# Patient Record
Sex: FEMALE | Race: OTHER | HISPANIC OR LATINO
[De-identification: names, ages, dates, MRNs, and addresses within clinical notes are randomized per-mention and may not be internally consistent; named-entity substitution may affect disease eponyms.]

---

## 2023-03-29 PROBLEM — Z00.00 ENCOUNTER FOR PREVENTIVE HEALTH EXAMINATION: Status: ACTIVE | Noted: 2023-03-29

## 2023-04-07 ENCOUNTER — APPOINTMENT (OUTPATIENT)
Dept: UROLOGY | Facility: CLINIC | Age: 78
End: 2023-04-07
Payer: MEDICARE

## 2023-04-07 ENCOUNTER — LABORATORY RESULT (OUTPATIENT)
Age: 78
End: 2023-04-07

## 2023-04-07 VITALS
BODY MASS INDEX: 29.45 KG/M2 | WEIGHT: 150 LBS | DIASTOLIC BLOOD PRESSURE: 79 MMHG | TEMPERATURE: 98.6 F | HEIGHT: 60 IN | RESPIRATION RATE: 16 BRPM | HEART RATE: 96 BPM | OXYGEN SATURATION: 98 % | SYSTOLIC BLOOD PRESSURE: 156 MMHG

## 2023-04-07 DIAGNOSIS — N39.3 STRESS INCONTINENCE (FEMALE) (MALE): ICD-10-CM

## 2023-04-07 DIAGNOSIS — R31.29 OTHER MICROSCOPIC HEMATURIA: ICD-10-CM

## 2023-04-07 DIAGNOSIS — R32 UNSPECIFIED URINARY INCONTINENCE: ICD-10-CM

## 2023-04-07 PROCEDURE — 99204 OFFICE O/P NEW MOD 45 MIN: CPT

## 2023-04-07 RX ORDER — MIRABEGRON 25 MG/1
25 TABLET, FILM COATED, EXTENDED RELEASE ORAL
Qty: 30 | Refills: 11 | Status: ACTIVE | COMMUNITY
Start: 2023-04-07 | End: 1900-01-01

## 2023-04-08 LAB
APPEARANCE: CLEAR
BILIRUBIN URINE: NEGATIVE
BLOOD URINE: ABNORMAL
COLOR: NORMAL
GLUCOSE QUALITATIVE U: NEGATIVE
KETONES URINE: NEGATIVE
LEUKOCYTE ESTERASE URINE: ABNORMAL
NITRITE URINE: POSITIVE
PH URINE: 7
PROTEIN URINE: NEGATIVE
SPECIFIC GRAVITY URINE: 1.01
UROBILINOGEN URINE: NORMAL

## 2023-04-09 PROBLEM — N39.3 FEMALE STRESS INCONTINENCE: Status: ACTIVE | Noted: 2023-04-09

## 2023-04-09 PROBLEM — R31.29 MICROSCOPIC HEMATURIA: Status: ACTIVE | Noted: 2023-04-09

## 2023-04-09 NOTE — HISTORY OF PRESENT ILLNESS
[FreeTextEntry1] : 78 yo with history of microhematuria and incontinence\par the patient describes stress incontinence\par no prior history of surgical intervention\par she denies gross hematuria\par \par as per patient her doctor has seen blood in her urine - referred for an assessment\par \par no studies or reports available\par \par PVR 27 cc\par \par \par \par  [Urinary Incontinence] : urinary incontinence [Urinary Retention] : no urinary retention [Urinary Urgency] : no urinary urgency [Urinary Frequency] : no urinary frequency [Nocturia] : no nocturia [Straining] : no straining [Weak Stream] : no weak stream [Hematuria - Gross] : no gross hematuria [Hematuria - Microscopic] : microscopic hematuria [Bladder Spasm] : no bladder spasm [Abdominal Pain] : no abdominal pain [Flank Pain] : no flank pain

## 2023-04-09 NOTE — ASSESSMENT
[FreeTextEntry1] : 76 yo with microhematuria and incontinence\par \par - UA, CUlture, Cytology\par - Renal and Bladder US\par - Mirabegron 25 mg po daily for incontinence\par - awaiting culutre to ensure no infection before we proceed with cystoscopy\par - f/u for possible cystoscopy and to assess reponse to treatment

## 2023-04-11 LAB — BACTERIA UR CULT: ABNORMAL

## 2023-04-13 ENCOUNTER — NON-APPOINTMENT (OUTPATIENT)
Age: 78
End: 2023-04-13

## 2023-05-19 ENCOUNTER — APPOINTMENT (OUTPATIENT)
Dept: UROLOGY | Facility: CLINIC | Age: 78
End: 2023-05-19

## 2024-01-08 ENCOUNTER — EMERGENCY (EMERGENCY)
Facility: HOSPITAL | Age: 79
LOS: 1 days | Discharge: HOME | End: 2024-01-08
Attending: STUDENT IN AN ORGANIZED HEALTH CARE EDUCATION/TRAINING PROGRAM
Payer: COMMERCIAL

## 2024-01-08 VITALS
RESPIRATION RATE: 16 BRPM | HEART RATE: 67 BPM | OXYGEN SATURATION: 99 % | HEIGHT: 60 IN | WEIGHT: 169.98 LBS | DIASTOLIC BLOOD PRESSURE: 75 MMHG | TEMPERATURE: 98 F | SYSTOLIC BLOOD PRESSURE: 155 MMHG

## 2024-01-08 VITALS
HEART RATE: 67 BPM | OXYGEN SATURATION: 99 % | DIASTOLIC BLOOD PRESSURE: 82 MMHG | SYSTOLIC BLOOD PRESSURE: 138 MMHG | RESPIRATION RATE: 18 BRPM | TEMPERATURE: 98 F

## 2024-01-08 LAB
ALBUMIN SERPL ELPH-MCNC: 3.8 G/DL — SIGNIFICANT CHANGE UP (ref 3.5–5)
ALBUMIN SERPL ELPH-MCNC: 3.8 G/DL — SIGNIFICANT CHANGE UP (ref 3.5–5)
ALP SERPL-CCNC: 92 U/L — SIGNIFICANT CHANGE UP (ref 40–120)
ALP SERPL-CCNC: 92 U/L — SIGNIFICANT CHANGE UP (ref 40–120)
ALT FLD-CCNC: 40 U/L DA — SIGNIFICANT CHANGE UP (ref 10–60)
ALT FLD-CCNC: 40 U/L DA — SIGNIFICANT CHANGE UP (ref 10–60)
ANION GAP SERPL CALC-SCNC: 3 MMOL/L — LOW (ref 5–17)
ANION GAP SERPL CALC-SCNC: 3 MMOL/L — LOW (ref 5–17)
APPEARANCE UR: CLEAR — SIGNIFICANT CHANGE UP
APPEARANCE UR: CLEAR — SIGNIFICANT CHANGE UP
APTT BLD: 35.4 SEC — SIGNIFICANT CHANGE UP (ref 24.5–35.6)
APTT BLD: 35.4 SEC — SIGNIFICANT CHANGE UP (ref 24.5–35.6)
AST SERPL-CCNC: 27 U/L — SIGNIFICANT CHANGE UP (ref 10–40)
AST SERPL-CCNC: 27 U/L — SIGNIFICANT CHANGE UP (ref 10–40)
BACTERIA # UR AUTO: ABNORMAL /HPF
BACTERIA # UR AUTO: ABNORMAL /HPF
BASOPHILS # BLD AUTO: 0.04 K/UL — SIGNIFICANT CHANGE UP (ref 0–0.2)
BASOPHILS # BLD AUTO: 0.04 K/UL — SIGNIFICANT CHANGE UP (ref 0–0.2)
BASOPHILS NFR BLD AUTO: 0.6 % — SIGNIFICANT CHANGE UP (ref 0–2)
BASOPHILS NFR BLD AUTO: 0.6 % — SIGNIFICANT CHANGE UP (ref 0–2)
BILIRUB SERPL-MCNC: 0.6 MG/DL — SIGNIFICANT CHANGE UP (ref 0.2–1.2)
BILIRUB SERPL-MCNC: 0.6 MG/DL — SIGNIFICANT CHANGE UP (ref 0.2–1.2)
BILIRUB UR-MCNC: NEGATIVE — SIGNIFICANT CHANGE UP
BILIRUB UR-MCNC: NEGATIVE — SIGNIFICANT CHANGE UP
BLD GP AB SCN SERPL QL: SIGNIFICANT CHANGE UP
BLD GP AB SCN SERPL QL: SIGNIFICANT CHANGE UP
BUN SERPL-MCNC: 15 MG/DL — SIGNIFICANT CHANGE UP (ref 7–18)
BUN SERPL-MCNC: 15 MG/DL — SIGNIFICANT CHANGE UP (ref 7–18)
CALCIUM SERPL-MCNC: 9 MG/DL — SIGNIFICANT CHANGE UP (ref 8.4–10.5)
CALCIUM SERPL-MCNC: 9 MG/DL — SIGNIFICANT CHANGE UP (ref 8.4–10.5)
CHLORIDE SERPL-SCNC: 107 MMOL/L — SIGNIFICANT CHANGE UP (ref 96–108)
CHLORIDE SERPL-SCNC: 107 MMOL/L — SIGNIFICANT CHANGE UP (ref 96–108)
CO2 SERPL-SCNC: 29 MMOL/L — SIGNIFICANT CHANGE UP (ref 22–31)
CO2 SERPL-SCNC: 29 MMOL/L — SIGNIFICANT CHANGE UP (ref 22–31)
COLOR SPEC: YELLOW — SIGNIFICANT CHANGE UP
COLOR SPEC: YELLOW — SIGNIFICANT CHANGE UP
CREAT SERPL-MCNC: 1.05 MG/DL — SIGNIFICANT CHANGE UP (ref 0.5–1.3)
CREAT SERPL-MCNC: 1.05 MG/DL — SIGNIFICANT CHANGE UP (ref 0.5–1.3)
DIFF PNL FLD: ABNORMAL
DIFF PNL FLD: ABNORMAL
EGFR: 54 ML/MIN/1.73M2 — LOW
EGFR: 54 ML/MIN/1.73M2 — LOW
EOSINOPHIL # BLD AUTO: 0.2 K/UL — SIGNIFICANT CHANGE UP (ref 0–0.5)
EOSINOPHIL # BLD AUTO: 0.2 K/UL — SIGNIFICANT CHANGE UP (ref 0–0.5)
EOSINOPHIL NFR BLD AUTO: 2.9 % — SIGNIFICANT CHANGE UP (ref 0–6)
EOSINOPHIL NFR BLD AUTO: 2.9 % — SIGNIFICANT CHANGE UP (ref 0–6)
EPI CELLS # UR: PRESENT
EPI CELLS # UR: PRESENT
GLUCOSE SERPL-MCNC: 84 MG/DL — SIGNIFICANT CHANGE UP (ref 70–99)
GLUCOSE SERPL-MCNC: 84 MG/DL — SIGNIFICANT CHANGE UP (ref 70–99)
GLUCOSE UR QL: NEGATIVE MG/DL — SIGNIFICANT CHANGE UP
GLUCOSE UR QL: NEGATIVE MG/DL — SIGNIFICANT CHANGE UP
HCT VFR BLD CALC: 41.2 % — SIGNIFICANT CHANGE UP (ref 34.5–45)
HCT VFR BLD CALC: 41.2 % — SIGNIFICANT CHANGE UP (ref 34.5–45)
HGB BLD-MCNC: 13.6 G/DL — SIGNIFICANT CHANGE UP (ref 11.5–15.5)
HGB BLD-MCNC: 13.6 G/DL — SIGNIFICANT CHANGE UP (ref 11.5–15.5)
IMM GRANULOCYTES NFR BLD AUTO: 0.3 % — SIGNIFICANT CHANGE UP (ref 0–0.9)
IMM GRANULOCYTES NFR BLD AUTO: 0.3 % — SIGNIFICANT CHANGE UP (ref 0–0.9)
INR BLD: 1 RATIO — SIGNIFICANT CHANGE UP (ref 0.85–1.18)
INR BLD: 1 RATIO — SIGNIFICANT CHANGE UP (ref 0.85–1.18)
KETONES UR-MCNC: NEGATIVE MG/DL — SIGNIFICANT CHANGE UP
KETONES UR-MCNC: NEGATIVE MG/DL — SIGNIFICANT CHANGE UP
LEUKOCYTE ESTERASE UR-ACNC: NEGATIVE — SIGNIFICANT CHANGE UP
LEUKOCYTE ESTERASE UR-ACNC: NEGATIVE — SIGNIFICANT CHANGE UP
LYMPHOCYTES # BLD AUTO: 1.67 K/UL — SIGNIFICANT CHANGE UP (ref 1–3.3)
LYMPHOCYTES # BLD AUTO: 1.67 K/UL — SIGNIFICANT CHANGE UP (ref 1–3.3)
LYMPHOCYTES # BLD AUTO: 23.9 % — SIGNIFICANT CHANGE UP (ref 13–44)
LYMPHOCYTES # BLD AUTO: 23.9 % — SIGNIFICANT CHANGE UP (ref 13–44)
MCHC RBC-ENTMCNC: 30.6 PG — SIGNIFICANT CHANGE UP (ref 27–34)
MCHC RBC-ENTMCNC: 30.6 PG — SIGNIFICANT CHANGE UP (ref 27–34)
MCHC RBC-ENTMCNC: 33 GM/DL — SIGNIFICANT CHANGE UP (ref 32–36)
MCHC RBC-ENTMCNC: 33 GM/DL — SIGNIFICANT CHANGE UP (ref 32–36)
MCV RBC AUTO: 92.8 FL — SIGNIFICANT CHANGE UP (ref 80–100)
MCV RBC AUTO: 92.8 FL — SIGNIFICANT CHANGE UP (ref 80–100)
MONOCYTES # BLD AUTO: 0.54 K/UL — SIGNIFICANT CHANGE UP (ref 0–0.9)
MONOCYTES # BLD AUTO: 0.54 K/UL — SIGNIFICANT CHANGE UP (ref 0–0.9)
MONOCYTES NFR BLD AUTO: 7.7 % — SIGNIFICANT CHANGE UP (ref 2–14)
MONOCYTES NFR BLD AUTO: 7.7 % — SIGNIFICANT CHANGE UP (ref 2–14)
NEUTROPHILS # BLD AUTO: 4.53 K/UL — SIGNIFICANT CHANGE UP (ref 1.8–7.4)
NEUTROPHILS # BLD AUTO: 4.53 K/UL — SIGNIFICANT CHANGE UP (ref 1.8–7.4)
NEUTROPHILS NFR BLD AUTO: 64.6 % — SIGNIFICANT CHANGE UP (ref 43–77)
NEUTROPHILS NFR BLD AUTO: 64.6 % — SIGNIFICANT CHANGE UP (ref 43–77)
NITRITE UR-MCNC: NEGATIVE — SIGNIFICANT CHANGE UP
NITRITE UR-MCNC: NEGATIVE — SIGNIFICANT CHANGE UP
NRBC # BLD: 0 /100 WBCS — SIGNIFICANT CHANGE UP (ref 0–0)
NRBC # BLD: 0 /100 WBCS — SIGNIFICANT CHANGE UP (ref 0–0)
PH UR: 6.5 — SIGNIFICANT CHANGE UP (ref 5–8)
PH UR: 6.5 — SIGNIFICANT CHANGE UP (ref 5–8)
PLATELET # BLD AUTO: 183 K/UL — SIGNIFICANT CHANGE UP (ref 150–400)
PLATELET # BLD AUTO: 183 K/UL — SIGNIFICANT CHANGE UP (ref 150–400)
POTASSIUM SERPL-MCNC: 4.2 MMOL/L — SIGNIFICANT CHANGE UP (ref 3.5–5.3)
POTASSIUM SERPL-MCNC: 4.2 MMOL/L — SIGNIFICANT CHANGE UP (ref 3.5–5.3)
POTASSIUM SERPL-SCNC: 4.2 MMOL/L — SIGNIFICANT CHANGE UP (ref 3.5–5.3)
POTASSIUM SERPL-SCNC: 4.2 MMOL/L — SIGNIFICANT CHANGE UP (ref 3.5–5.3)
PROT SERPL-MCNC: 7.9 G/DL — SIGNIFICANT CHANGE UP (ref 6–8.3)
PROT SERPL-MCNC: 7.9 G/DL — SIGNIFICANT CHANGE UP (ref 6–8.3)
PROT UR-MCNC: NEGATIVE MG/DL — SIGNIFICANT CHANGE UP
PROT UR-MCNC: NEGATIVE MG/DL — SIGNIFICANT CHANGE UP
PROTHROM AB SERPL-ACNC: 11.4 SEC — SIGNIFICANT CHANGE UP (ref 9.5–13)
PROTHROM AB SERPL-ACNC: 11.4 SEC — SIGNIFICANT CHANGE UP (ref 9.5–13)
RBC # BLD: 4.44 M/UL — SIGNIFICANT CHANGE UP (ref 3.8–5.2)
RBC # BLD: 4.44 M/UL — SIGNIFICANT CHANGE UP (ref 3.8–5.2)
RBC # FLD: 14.6 % — HIGH (ref 10.3–14.5)
RBC # FLD: 14.6 % — HIGH (ref 10.3–14.5)
RBC CASTS # UR COMP ASSIST: 4 /HPF — SIGNIFICANT CHANGE UP (ref 0–4)
RBC CASTS # UR COMP ASSIST: 4 /HPF — SIGNIFICANT CHANGE UP (ref 0–4)
SODIUM SERPL-SCNC: 139 MMOL/L — SIGNIFICANT CHANGE UP (ref 135–145)
SODIUM SERPL-SCNC: 139 MMOL/L — SIGNIFICANT CHANGE UP (ref 135–145)
SP GR SPEC: 1.01 — SIGNIFICANT CHANGE UP (ref 1–1.03)
SP GR SPEC: 1.01 — SIGNIFICANT CHANGE UP (ref 1–1.03)
UROBILINOGEN FLD QL: 1 MG/DL — SIGNIFICANT CHANGE UP (ref 0.2–1)
UROBILINOGEN FLD QL: 1 MG/DL — SIGNIFICANT CHANGE UP (ref 0.2–1)
WBC # BLD: 7 K/UL — SIGNIFICANT CHANGE UP (ref 3.8–10.5)
WBC # BLD: 7 K/UL — SIGNIFICANT CHANGE UP (ref 3.8–10.5)
WBC # FLD AUTO: 7 K/UL — SIGNIFICANT CHANGE UP (ref 3.8–10.5)
WBC # FLD AUTO: 7 K/UL — SIGNIFICANT CHANGE UP (ref 3.8–10.5)
WBC UR QL: 0 /HPF — SIGNIFICANT CHANGE UP (ref 0–5)
WBC UR QL: 0 /HPF — SIGNIFICANT CHANGE UP (ref 0–5)

## 2024-01-08 PROCEDURE — 80053 COMPREHEN METABOLIC PANEL: CPT

## 2024-01-08 PROCEDURE — 99285 EMERGENCY DEPT VISIT HI MDM: CPT | Mod: 25

## 2024-01-08 PROCEDURE — 86850 RBC ANTIBODY SCREEN: CPT

## 2024-01-08 PROCEDURE — 86901 BLOOD TYPING SEROLOGIC RH(D): CPT

## 2024-01-08 PROCEDURE — 85730 THROMBOPLASTIN TIME PARTIAL: CPT

## 2024-01-08 PROCEDURE — 36415 COLL VENOUS BLD VENIPUNCTURE: CPT

## 2024-01-08 PROCEDURE — 87186 SC STD MICRODIL/AGAR DIL: CPT

## 2024-01-08 PROCEDURE — 74177 CT ABD & PELVIS W/CONTRAST: CPT | Mod: MA

## 2024-01-08 PROCEDURE — 93975 VASCULAR STUDY: CPT

## 2024-01-08 PROCEDURE — 76830 TRANSVAGINAL US NON-OB: CPT | Mod: 26

## 2024-01-08 PROCEDURE — 76857 US EXAM PELVIC LIMITED: CPT

## 2024-01-08 PROCEDURE — 93975 VASCULAR STUDY: CPT | Mod: 26

## 2024-01-08 PROCEDURE — 74177 CT ABD & PELVIS W/CONTRAST: CPT | Mod: 26,MA

## 2024-01-08 PROCEDURE — 87086 URINE CULTURE/COLONY COUNT: CPT

## 2024-01-08 PROCEDURE — 76830 TRANSVAGINAL US NON-OB: CPT

## 2024-01-08 PROCEDURE — 85610 PROTHROMBIN TIME: CPT

## 2024-01-08 PROCEDURE — 81001 URINALYSIS AUTO W/SCOPE: CPT

## 2024-01-08 PROCEDURE — 76856 US EXAM PELVIC COMPLETE: CPT | Mod: 26,59

## 2024-01-08 PROCEDURE — 86900 BLOOD TYPING SEROLOGIC ABO: CPT

## 2024-01-08 PROCEDURE — 99285 EMERGENCY DEPT VISIT HI MDM: CPT

## 2024-01-08 PROCEDURE — 85025 COMPLETE CBC W/AUTO DIFF WBC: CPT

## 2024-01-08 NOTE — ED PROVIDER NOTE - OBJECTIVE STATEMENT
78-year-old female history of hypertension, hyperlipidemia, presenting with chief complaint of vaginal bleeding and lower abdominal pain  since yesterday.  Patient is denying any chest pain, lightheadedness, syncope, or dizziness.  No nausea, vomiting, fevers or chills.    meds: aspirin, statin, losartan, pantoprazole, baclofen   NKDA

## 2024-01-08 NOTE — ED PROVIDER NOTE - PATIENT PORTAL LINK FT
You can access the FollowMyHealth Patient Portal offered by [facility] by registering at the following website: [email or web address]. By joining [employer]’s FollowMyHealth portal, you will also be able to view your health information using other applications (apps) compatible with our system. You can access the FollowMyHealth Patient Portal offered by [facility] by registering at the following website: [email or web address]. By joining [employer]’s FollowMyHealth portal, you will also be able to view your health information using other applications (apps) compatible with our system.

## 2024-01-08 NOTE — ED PROVIDER NOTE - PROGRESS NOTE DETAILS
CT showing Complex cystic mass of the right adnexa with associated septations and   peripheral nodularity. This may represent a pedunculated ovarian or paraovarian complex cystic mass. Negative for torsion.  pt reassessed, abdomen soft.  sister at bedside and all results reviewed.  Will dc with patient to follow up with GYN-Onc

## 2024-01-08 NOTE — ED PROVIDER NOTE - NSPTACCESSSVCSAPPTDETAILS_ED_ALL_ED_FT
Complex cystic mass of the right adnexa with associated septations and peripheral nodularity.   concerning for malignancy

## 2024-01-08 NOTE — ED ADULT NURSE NOTE - NSFALLUNIVINTERV_ED_ALL_ED
Bed/Stretcher in lowest position, wheels locked, appropriate side rails in place/Call bell, personal items and telephone in reach/Instruct patient to call for assistance before getting out of bed/chair/stretcher/Non-slip footwear applied when patient is off stretcher/[place] to call system/Physically safe environment - no spills, clutter or unnecessary equipment/Purposeful proactive rounding/Room/bathroom lighting operational, light cord in reach Bed/Stretcher in lowest position, wheels locked, appropriate side rails in place/Call bell, personal items and telephone in reach/Instruct patient to call for assistance before getting out of bed/chair/stretcher/Non-slip footwear applied when patient is off stretcher/[place] to call system/Physically safe environment - no spills, clutter or unnecessary equipment/Purposeful proactive rounding/Room/bathroom lighting operational, light cord in reach

## 2024-01-08 NOTE — ED ADULT NURSE NOTE - SUICIDE SCREENING QUESTION 1
Routing refill request to provider for review/approval because:  Drug not active on patient's medication list.    Kimberly Flores RN, [facility]           No

## 2024-01-08 NOTE — ED PROVIDER NOTE - CARE PLAN
1 Principal Discharge DX:	Vaginal bleeding   Principal Discharge DX:	Vaginal bleeding  Secondary Diagnosis:	Pelvic mass

## 2024-01-08 NOTE — ED PROVIDER NOTE - CLINICAL SUMMARY MEDICAL DECISION MAKING FREE TEXT BOX
78-year-old female history of hypertension, hyperlipidemia, presenting with chief complaint of vaginal bleeding and lower abdominal pain  since yesterday. Concern for possible malignancy.  Will check CT abd pelvis as well as cbc. Pt well appearing, +scant vaginal bleeding

## 2024-01-10 PROBLEM — I10 ESSENTIAL (PRIMARY) HYPERTENSION: Chronic | Status: ACTIVE | Noted: 2024-01-08

## 2024-01-10 LAB
-  AMOXICILLIN/CLAVULANIC ACID: SIGNIFICANT CHANGE UP
-  AMOXICILLIN/CLAVULANIC ACID: SIGNIFICANT CHANGE UP
-  AMPICILLIN/SULBACTAM: SIGNIFICANT CHANGE UP
-  AMPICILLIN/SULBACTAM: SIGNIFICANT CHANGE UP
-  AMPICILLIN: SIGNIFICANT CHANGE UP
-  AMPICILLIN: SIGNIFICANT CHANGE UP
-  AZTREONAM: SIGNIFICANT CHANGE UP
-  AZTREONAM: SIGNIFICANT CHANGE UP
-  CEFAZOLIN: SIGNIFICANT CHANGE UP
-  CEFAZOLIN: SIGNIFICANT CHANGE UP
-  CEFEPIME: SIGNIFICANT CHANGE UP
-  CEFEPIME: SIGNIFICANT CHANGE UP
-  CEFOXITIN: SIGNIFICANT CHANGE UP
-  CEFOXITIN: SIGNIFICANT CHANGE UP
-  CEFTRIAXONE: SIGNIFICANT CHANGE UP
-  CEFTRIAXONE: SIGNIFICANT CHANGE UP
-  CEFUROXIME: SIGNIFICANT CHANGE UP
-  CEFUROXIME: SIGNIFICANT CHANGE UP
-  CIPROFLOXACIN: SIGNIFICANT CHANGE UP
-  CIPROFLOXACIN: SIGNIFICANT CHANGE UP
-  ERTAPENEM: SIGNIFICANT CHANGE UP
-  ERTAPENEM: SIGNIFICANT CHANGE UP
-  GENTAMICIN: SIGNIFICANT CHANGE UP
-  GENTAMICIN: SIGNIFICANT CHANGE UP
-  IMIPENEM: SIGNIFICANT CHANGE UP
-  IMIPENEM: SIGNIFICANT CHANGE UP
-  LEVOFLOXACIN: SIGNIFICANT CHANGE UP
-  LEVOFLOXACIN: SIGNIFICANT CHANGE UP
-  MEROPENEM: SIGNIFICANT CHANGE UP
-  MEROPENEM: SIGNIFICANT CHANGE UP
-  NITROFURANTOIN: SIGNIFICANT CHANGE UP
-  NITROFURANTOIN: SIGNIFICANT CHANGE UP
-  PIPERACILLIN/TAZOBACTAM: SIGNIFICANT CHANGE UP
-  PIPERACILLIN/TAZOBACTAM: SIGNIFICANT CHANGE UP
-  TOBRAMYCIN: SIGNIFICANT CHANGE UP
-  TOBRAMYCIN: SIGNIFICANT CHANGE UP
-  TRIMETHOPRIM/SULFAMETHOXAZOLE: SIGNIFICANT CHANGE UP
-  TRIMETHOPRIM/SULFAMETHOXAZOLE: SIGNIFICANT CHANGE UP
CULTURE RESULTS: ABNORMAL
CULTURE RESULTS: ABNORMAL
METHOD TYPE: SIGNIFICANT CHANGE UP
METHOD TYPE: SIGNIFICANT CHANGE UP
ORGANISM # SPEC MICROSCOPIC CNT: ABNORMAL
SPECIMEN SOURCE: SIGNIFICANT CHANGE UP
SPECIMEN SOURCE: SIGNIFICANT CHANGE UP

## 2024-01-16 ENCOUNTER — NON-APPOINTMENT (OUTPATIENT)
Age: 79
End: 2024-01-16

## 2024-01-17 PROBLEM — N39.0 UTI (URINARY TRACT INFECTION): Status: ACTIVE | Noted: 2024-01-17 | Resolved: 2024-02-16

## 2024-01-17 RX ORDER — SULFAMETHOXAZOLE AND TRIMETHOPRIM 800; 160 MG/1; MG/1
800-160 TABLET ORAL
Qty: 6 | Refills: 0 | Status: ACTIVE | COMMUNITY
Start: 2024-01-17 | End: 1900-01-01

## 2024-01-19 ENCOUNTER — LABORATORY RESULT (OUTPATIENT)
Age: 79
End: 2024-01-19

## 2024-01-19 ENCOUNTER — APPOINTMENT (OUTPATIENT)
Dept: GYNECOLOGIC ONCOLOGY | Facility: CLINIC | Age: 79
End: 2024-01-19
Payer: MEDICARE

## 2024-01-19 VITALS
HEIGHT: 60 IN | DIASTOLIC BLOOD PRESSURE: 84 MMHG | BODY MASS INDEX: 32.98 KG/M2 | SYSTOLIC BLOOD PRESSURE: 155 MMHG | WEIGHT: 168 LBS | HEART RATE: 69 BPM

## 2024-01-19 DIAGNOSIS — Z80.9 FAMILY HISTORY OF MALIGNANT NEOPLASM, UNSPECIFIED: ICD-10-CM

## 2024-01-19 DIAGNOSIS — Z86.39 PERSONAL HISTORY OF OTHER ENDOCRINE, NUTRITIONAL AND METABOLIC DISEASE: ICD-10-CM

## 2024-01-19 DIAGNOSIS — Z82.49 FAMILY HISTORY OF ISCHEMIC HEART DISEASE AND OTHER DISEASES OF THE CIRCULATORY SYSTEM: ICD-10-CM

## 2024-01-19 DIAGNOSIS — N39.0 URINARY TRACT INFECTION, SITE NOT SPECIFIED: ICD-10-CM

## 2024-01-19 DIAGNOSIS — I10 ESSENTIAL (PRIMARY) HYPERTENSION: ICD-10-CM

## 2024-01-19 DIAGNOSIS — E07.9 DISORDER OF THYROID, UNSPECIFIED: ICD-10-CM

## 2024-01-19 DIAGNOSIS — Z78.9 OTHER SPECIFIED HEALTH STATUS: ICD-10-CM

## 2024-01-19 DIAGNOSIS — R32 UNSPECIFIED URINARY INCONTINENCE: ICD-10-CM

## 2024-01-19 DIAGNOSIS — Z80.0 FAMILY HISTORY OF MALIGNANT NEOPLASM OF DIGESTIVE ORGANS: ICD-10-CM

## 2024-01-19 DIAGNOSIS — Z80.3 FAMILY HISTORY OF MALIGNANT NEOPLASM OF BREAST: ICD-10-CM

## 2024-01-19 DIAGNOSIS — E78.00 PURE HYPERCHOLESTEROLEMIA, UNSPECIFIED: ICD-10-CM

## 2024-01-19 DIAGNOSIS — Z86.79 PERSONAL HISTORY OF OTHER DISEASES OF THE CIRCULATORY SYSTEM: ICD-10-CM

## 2024-01-19 DIAGNOSIS — Z80.42 FAMILY HISTORY OF MALIGNANT NEOPLASM OF PROSTATE: ICD-10-CM

## 2024-01-19 PROCEDURE — 99205 OFFICE O/P NEW HI 60 MIN: CPT | Mod: 25

## 2024-01-19 PROCEDURE — 58100 BIOPSY OF UTERUS LINING: CPT

## 2024-01-19 RX ORDER — LEVOTHYROXINE SODIUM 100 UG/1
100 TABLET ORAL
Refills: 0 | Status: ACTIVE | COMMUNITY

## 2024-01-19 RX ORDER — ATORVASTATIN CALCIUM 20 MG/1
20 TABLET, FILM COATED ORAL
Refills: 0 | Status: ACTIVE | COMMUNITY

## 2024-01-19 RX ORDER — LINACLOTIDE 145 UG/1
145 CAPSULE, GELATIN COATED ORAL
Refills: 0 | Status: ACTIVE | COMMUNITY

## 2024-01-19 RX ORDER — ASPIRIN 81 MG
81 TABLET, DELAYED RELEASE (ENTERIC COATED) ORAL
Refills: 0 | Status: ACTIVE | COMMUNITY

## 2024-01-19 RX ORDER — FINERENONE 10 MG/1
10 TABLET, FILM COATED ORAL
Refills: 0 | Status: ACTIVE | COMMUNITY

## 2024-01-19 RX ORDER — PANTOPRAZOLE 40 MG/1
40 TABLET, DELAYED RELEASE ORAL
Refills: 0 | Status: ACTIVE | COMMUNITY

## 2024-01-19 RX ORDER — BACLOFEN 20 MG/1
20 TABLET ORAL
Refills: 0 | Status: ACTIVE | COMMUNITY

## 2024-01-19 RX ORDER — LOSARTAN POTASSIUM 50 MG/1
50 TABLET, FILM COATED ORAL
Refills: 0 | Status: ACTIVE | COMMUNITY

## 2024-01-19 NOTE — OB HISTORY
[Total Preg ___] : : [unfilled] [Full Term ___] : [unfilled] (full-term) [Living ___] : [unfilled] (living) [Vaginal ___] : [unfilled] vaginal delivery(s) [AB Spont ___] : [unfilled] miscarriage(s) [Menarche Age ____] : age at menarche was [unfilled] [Menopause  Age ____] : menopause occurred at age [unfilled]

## 2024-01-20 LAB
ALBUMIN SERPL ELPH-MCNC: 4.6 G/DL
ALP BLD-CCNC: 92 U/L
ALT SERPL-CCNC: 25 U/L
ANION GAP SERPL CALC-SCNC: 14 MMOL/L
APTT BLD: 38.3 SEC
AST SERPL-CCNC: 26 U/L
BASOPHILS # BLD AUTO: 0.05 K/UL
BASOPHILS NFR BLD AUTO: 0.6 %
BILIRUB SERPL-MCNC: 0.5 MG/DL
BUN SERPL-MCNC: 17 MG/DL
CALCIUM SERPL-MCNC: 9.5 MG/DL
CANCER AG125 SERPL-ACNC: 8 U/ML
CANCER AG19-9 SERPL-ACNC: <2 U/ML
CEA SERPL-MCNC: 1 NG/ML
CHLORIDE SERPL-SCNC: 103 MMOL/L
CO2 SERPL-SCNC: 24 MMOL/L
CREAT SERPL-MCNC: 0.99 MG/DL
EGFR: 58 ML/MIN/1.73M2
EOSINOPHIL # BLD AUTO: 0.12 K/UL
EOSINOPHIL NFR BLD AUTO: 1.6 %
ESTIMATED AVERAGE GLUCOSE: 120 MG/DL
GLUCOSE SERPL-MCNC: 76 MG/DL
HBA1C MFR BLD HPLC: 5.8 %
HCT VFR BLD CALC: 41 %
HGB BLD-MCNC: 12.9 G/DL
IMM GRANULOCYTES NFR BLD AUTO: 0.3 %
INR PPP: 0.93 RATIO
LYMPHOCYTES # BLD AUTO: 2.49 K/UL
LYMPHOCYTES NFR BLD AUTO: 32.2 %
MAN DIFF?: NORMAL
MCHC RBC-ENTMCNC: 30.4 PG
MCHC RBC-ENTMCNC: 31.5 GM/DL
MCV RBC AUTO: 96.5 FL
MONOCYTES # BLD AUTO: 0.56 K/UL
MONOCYTES NFR BLD AUTO: 7.2 %
NEUTROPHILS # BLD AUTO: 4.5 K/UL
NEUTROPHILS NFR BLD AUTO: 58.1 %
PLATELET # BLD AUTO: 251 K/UL
POTASSIUM SERPL-SCNC: 4.6 MMOL/L
PROT SERPL-MCNC: 7.6 G/DL
PT BLD: 10.6 SEC
RBC # BLD: 4.25 M/UL
RBC # FLD: 14.9 %
SODIUM SERPL-SCNC: 141 MMOL/L
WBC # FLD AUTO: 7.74 K/UL

## 2024-01-20 NOTE — ASSESSMENT
[FreeTextEntry1] : 79 yo with complex right pelvic mass and PMB as well as 1.1 cm enhancing lesion in the lower pole of the right kidney, suspicious for a renal cell carcinoma here for initial consultation.

## 2024-01-20 NOTE — DISCUSSION/SUMMARY
[Reviewed Clinical Lab Test(s)] : Results of clinical tests were reviewed. [Reviewed Radiology Report(s)] : Radiology reports were reviewed. [Reviewed Radiology Film/Image(s)] : Images from radiology studies were reviewed and examined. [Discuss Alternatives/Risks/Benefits w/Patient] : All alternatives, risks, and benefits were discussed with the patient/family and all questions were answered.  Patient expressed good understanding and appreciates the importance of follow up as recommended. [Visit Time ___ Minutes] : [unfilled] minutes [Face to Face Time___ Minutes] : with [unfilled] minutes in face to face consultation. [FreeTextEntry1] : - Patients history and work up to date reviewed in detail. - Differential diagnosis of this complex pelvic mass discussed in detail including; benign, premalignant and malignant etiologies. The use of diagrams and drawings were used to help facilitate the conversation throughout. We discussed the rationale for a pelvic MRI to help evaluate the pelvic mass better as well as the rationale for MRI abdomen to evaluate the concerning 1.1 cm enhancing lesion in the lower pole of the right kidney, suspicious for a renal cell carcinoma. We discussed the importance of follow up with urology and she requested a female provider urologist. Referral sent to Dr. Alegre (task also sent). - Rationale for EMB discussed in detail given her PMB. Patient tolerated the procedure well and standard post procedure precautions provided. Pap smear also done.  - For close interval follow up in 2 weeks to review biopsy results and MRI results. - I spent the time noted on the day of this patient encounter preparing for, providing and documenting the above service. I have counseled and educated the patient on the differential, workup, disease course, and treatment/management plan. Education was provided to the patient during this encounter. All questions and concerns were answered and addressed in detail. -  Pt will complete course of ABX for UTI.

## 2024-01-20 NOTE — PHYSICAL EXAM
[Chaperone Present] : A chaperone was present in the examining room during all aspects of the physical examination [Normal] : Recto-Vaginal Exam: Normal [Fully active, able to carry on all pre-disease performance without restriction] : Status 0 - Fully active, able to carry on all pre-disease performance without restriction [Abnormal] : Bimanual Exam: Abnormal [FreeTextEntry1] : Grisel CHASE [de-identified] : soft, nontender [de-identified] : right adnexal fullness, no TTP

## 2024-01-20 NOTE — PROCEDURE
[Endometrial Biopsy] : an endometrial biopsy [Postmenopausal Bleeding] : postmenopausal bleeding [Patient] : the patient [Written consent] : written consent was obtained prior to the procedure and is detailed in the patient's record [Betadine] : betadine [Silver Nitrate] : silver nitrate [Yes] : the specimen was sent to pathology [No Complications] : none [Tolerated Well] : the patient tolerated the procedure well [FreeTextEntry1] : All the risks, benefits and alternatives to the procedure were extensively discussed with the patient and written consent was obtained. A timeout protocol was performed prior to initiating the procedure. Bimanual examination was done to determine the position of the uterus.  Vaginal speculum was inserted, and the cervix was visualized.  The cervix was cleansed with antiseptic solution.  A single tooth tenaculum was placed along the anterior lip of the cervix. The device was inserted through the cervical canal, into the uterine cavity, and up to the fundus. The depth of the uterus was determined with the instrument The instrument was withdrawn as it was rotated 3-4 times to obtain the sample which was sent in formalin to pathology. Silver nitrate was applied to the tenaculum sites and excellent hemostasis noted.    Followup: The patient tolerated the procedure well without complications.  Standard post-procedure care is explained and return precautions are given.  Specimen sent to pathology for analysis.

## 2024-01-20 NOTE — CHIEF COMPLAINT
[FreeTextEntry1] :   New Patient Consultation for pelvic mass; pmb  CT notes renal mass concerning for malignancy complex ovarian mass UCx notes +UTI  - - abx sent to pharmacy on 1/17/24.

## 2024-01-20 NOTE — LETTER BODY
[Dear  ___] : Dear  [unfilled], [I had the pleasure of evaluating your patient, [unfilled] for ___] : I had the pleasure of evaluating your patient, [unfilled] for [unfilled]. [Attached please find my note.] : Attached please find my note. [FreeTextEntry1] : Labs, endometrial biopsy results, imaging

## 2024-01-20 NOTE — HISTORY OF PRESENT ILLNESS
[FreeTextEntry1] : ref:  [facility] ED  PCP:Antionette Morton MD [phone] GI:  Cannot recall name of GI.  Last colonoscopy  in [place] Cards:  Matt Angulo MD  Patient is [place]ian-speaking only. Patient presents with her sister, Zhanna, during today's visit.  Ms. Salazar, 78 years old, states she had postmenopausal bleeding x 4 days and presented to the ED on 2024.  Workup included imaging identifying right ovarian mass, thickened endometrium, as well as a right kidney mass concerning for RCC.  She was referred for further management.  Denies f/c/n/v/d.  Denies changes to bowel or bladder habits; denies unexplained weight loss or gain.  She does report 3 months of pelvic pain and low back pain.  She does have chronic issues of irritable bowel, as well as urinary urgency and frequency as well as urinary incontinence.  2024 [facility] ED  Chief Complaint: The patient is a 78y Female complaining of vaginal bleeding.  HPI Objective Statement: 78-year-old female history of hypertension, hyperlipidemia, presenting with chief complaint of vaginal bleeding and lower abdominal pain since yesterday.  Patient is denying any chest pain, lightheadedness, syncope, or dizziness.  No nausea, vomiting, fevers or chills. meds: aspirin, statin, losartan, pantoprazole, baclofen  NKDA  Imagin2024 CT A/P ([facility]) LOWER CHEST: Small bilateral atelectasis. LIVER: Hepatic steatosis. A few small hypodense lesions in the liver, too small to characterize. 7 mm enhancing lesion in hepatic segment 5 (image 51 series 2); if clinically indicated, abdominal MR without and with IV contrast may be pursued for further evaluation. BILE DUCTS: Normal caliber. GALLBLADDER: Within normal limits. SPLEEN: Within normal limits. PANCREAS: Within normal limits. ADRENALS: Within normal limits. KIDNEYS/URETERS: 1.9 cm left renal cyst. Apparent 1.1 cm enhancing lesion in the lower pole of the right kidney, suspicious for a renal cell carcinoma. No evidence for a ureteral calculus. No hydronephrosis. BLADDER: Within normal limits. REPRODUCTIVE ORGANS: A few myometrial masses, likely representing uterine fibroids. The left adnexa appears unremarkable. 4.2 x 2.9 cm asymmetric soft tissue structure in the right adnexa may represent a right ovarian mass, or abnormal right ovary due to torsion. If clinically indicated,  pelvic ultrasound may be pursued for further evaluation. BOWEL: No bowel obstruction or grossly thickened bowel wall. Appendix within normal limits. PERITONEUM: No free air or ascites. VESSELS: Calcified atherosclerotic disease. RETROPERITONEUM/LYMPH NODES: No lymphadenopathy. ABDOMINAL WALL: Small fat-containing umbilical hernia. BONES: Degenerative joint disease. Age indeterminate compression fracture at the superior endplate of T11 vertebra. IMPRESSION: 4.2 x 2.9 cm asymmetric soft tissue structure in the right adnexa may represent a right ovarian mass, or abnormal right ovary due to torsion.  If clinically indicated, pelvic ultrasound may be pursued for further evaluation. 7 mm enhancing lesion in hepatic segment 5. Apparent 1.1 cm enhancing lesion in the lower pole of the right kidney, suspicious for a renal cell carcinoma. If clinically indicated, abdominal MR without and with IV contrast may be pursued for further evaluation. Age indeterminate compression fracture at the superior endplate of T11 vertebra.  24 TVUS ([facility]) Uterus: 7.6 cm x 4.1 cm x 5.2 cm. multiple fibroids are appreciated.  There is a posterior submucosal fibroid measuring up to 1.4 cm.  Posterior subserosal fibroid is also noted which measures up to 7 cm.  Multiple parenchymal cystic spaces are appreciated and there is diffuse nonspecific heterogeneity of the uterine parenchyma. Endometrium: 7 mm. Tiny cyst is seen within the endometrium. Right ovary: 2.0 cm x 2.3 cm x 2.3 cm. Posterior to the Right Ovary within the Right Adnexa There Is a 4.3 x 3.6 x 3.9 cm septated cyst.  There is some nodularity of the cyst wall. Arterial and venous flow is  demonstrated.This may represent a pedunculated ovarian versus paraovarian  cystic lesion. Left ovary: 1.5 cm x 1.2 cm x 1.1 cm. Within normal limits. Arterial and venous flow is demonstrated. Fluid: None. IMPRESSION: Complex cystic mass of the right adnexa with associated septations and  peripheral nodularity. This may represent a pedunculated ovarian or paraovarian complex cystic mass. Other differential considerations include mesenteric cyst. The origin of this cystic mass is not clearly delineated on this sonogram. This nonetheless has some suspicious features which may indicate a malignant process. Consider further correlation with contrast-enhanced MRI. No sonographic evidence of acute ovarian torsion at the time scanning. Heterogeneous myomatous uterus. Tiny endometrial cyst with the endometrium measuring 7 mm. This is  prominent in size given patient's postmenopausal status. Dedicated GYN assessment is advised and should dictate the need for further imaging or intervention workup.  POB: G6, P4 (2 miscarriages, 4 vaginal deliveries, oldest child is 60 youngest child is in their 40s, patient cannot recall age) PGYN: Menarche age 12, LMP age 38 PMH: Hypertension, urinary incontinence, urge incontinence, hypothyroidism, hyperlipidemia, irritable bowel syndrome Meds: Myrbetriz, pantoprazole, baclofen, Synthroid, Linzess, Kerendia, aspirin 81 mg, atorvastatin, losartan Allergies: NKDA PSH: No past surgical history Social Hx: Non-smoker, no alcohol, no marijuana, no illicit drugs, lives with her sister. FH: Father-throat cancer-age 75; maternal cousin-breast cancer-age 30; maternal first cousin-kidney cancer-age 50; Brother-prostate cancer-age 70.   Health Maintenance: Mammogram: .  Per patient normal. Colonoscopy: .  Per patient normal PAP: Not in the last 5 to 10 years.

## 2024-01-20 NOTE — REVIEW OF SYSTEMS
[Negative] : Musculoskeletal [Abn Vag Bleeding] : abnormal vaginal bleeding [FreeTextEntry4] : pelvic and low back pain

## 2024-01-22 LAB
HPV HIGH+LOW RISK DNA PNL CVX: NOT DETECTED
INHIBIN B: <7 PG/ML

## 2024-01-27 LAB — CYTOLOGY CVX/VAG DOC THIN PREP: ABNORMAL

## 2024-02-01 ENCOUNTER — APPOINTMENT (OUTPATIENT)
Dept: MRI IMAGING | Facility: CLINIC | Age: 79
End: 2024-02-01
Payer: MEDICARE

## 2024-02-01 PROCEDURE — 72197 MRI PELVIS W/O & W/DYE: CPT

## 2024-02-01 PROCEDURE — 74183 MRI ABD W/O CNTR FLWD CNTR: CPT

## 2024-02-01 PROCEDURE — A9585: CPT

## 2024-02-02 ENCOUNTER — APPOINTMENT (OUTPATIENT)
Dept: GYNECOLOGIC ONCOLOGY | Facility: CLINIC | Age: 79
End: 2024-02-02
Payer: MEDICARE

## 2024-02-02 VITALS
HEART RATE: 86 BPM | BODY MASS INDEX: 32.98 KG/M2 | SYSTOLIC BLOOD PRESSURE: 138 MMHG | DIASTOLIC BLOOD PRESSURE: 78 MMHG | HEIGHT: 60 IN | OXYGEN SATURATION: 97 % | WEIGHT: 168 LBS

## 2024-02-02 DIAGNOSIS — N95.0 POSTMENOPAUSAL BLEEDING: ICD-10-CM

## 2024-02-02 PROCEDURE — 99213 OFFICE O/P EST LOW 20 MIN: CPT

## 2024-02-02 NOTE — ASSESSMENT
[FreeTextEntry1] : 79 yo with complex right pelvic mass and PMB as well as 1.1 cm enhancing lesion in the lower pole of the right kidney, suspicious for a renal cell carcinoma here for follow up consultation.

## 2024-02-02 NOTE — HISTORY OF PRESENT ILLNESS
[FreeTextEntry1] : ref:  [facility] ED  PCP:Antionette Morton MD [phone] GI:  Cannot recall name of GI.  Last colonoscopy  in [place] Cards:  Matt Angulo MD  Patient is [place]odian-speaking only. Patient presents with her sister, Zhanna, during today's visit.  Ms. Salazar, 78 years old, states she had postmenopausal bleeding x 4 days and presented to the ED on 2024.  Workup included imaging identifying right ovarian mass, thickened endometrium, as well as a right kidney mass concerning for RCC.  She was referred for further management.  Denies f/c/n/v/d.  Denies changes to bowel or bladder habits; denies unexplained weight loss or gain.  She does report 3 months of pelvic pain and low back pain.  She does have chronic issues of irritable bowel, as well as urinary urgency and frequency as well as urinary incontinence.  The patient's only issue is some mild vaginal spotting. Since her last visit the patient has an appointment with Dr. Alegre from urology and the patient was reminded of this visit which is upcoming on Monday, .  Instructions provided to the patient on where to report for this visit with Dr. Alegre and she expressed verbal understanding.  Additionally the patient had her MRI performed yesterday however the results are still pending and we discussed that we will review this via telemedicine visit once available.  Also we reached out to pathology and her endometrial biopsy results are still pending.  We discussed that at her next telemedicine visit we reviewed the MRI as well as her pending pathology.  Nonetheless we discussed that once her urological issues have been worked up an endometrial biopsy is back to inform us of the potential management needed for this that at least she would likely need surgical management in the form of bilateral salpingo-oophorectomy and D&C but possible hysterectomy depending on what the endometrial biopsy shows.  The patient expressed that she understands the above and that we will have a telemedicine visit in approximately 1 week to review all of her workup thus far and discuss her definitive management plan.  2024 [facility] ED  Chief Complaint: The patient is a 78y Female complaining of vaginal bleeding.  HPI Objective Statement: 78-year-old female history of hypertension, hyperlipidemia, presenting with chief complaint of vaginal bleeding and lower abdominal pain since yesterday.  Patient is denying any chest pain, lightheadedness, syncope, or dizziness.  No nausea, vomiting, fevers or chills. meds: aspirin, statin, losartan, pantoprazole, baclofen  NKDA  Imagin2024 CT A/P ([facility]) LOWER CHEST: Small bilateral atelectasis. LIVER: Hepatic steatosis. A few small hypodense lesions in the liver, too small to characterize. 7 mm enhancing lesion in hepatic segment 5 (image 51 series 2); if clinically indicated, abdominal MR without and with IV contrast may be pursued for further evaluation. BILE DUCTS: Normal caliber. GALLBLADDER: Within normal limits. SPLEEN: Within normal limits. PANCREAS: Within normal limits. ADRENALS: Within normal limits. KIDNEYS/URETERS: 1.9 cm left renal cyst. Apparent 1.1 cm enhancing lesion in the lower pole of the right kidney, suspicious for a renal cell carcinoma. No evidence for a ureteral calculus. No hydronephrosis. BLADDER: Within normal limits. REPRODUCTIVE ORGANS: A few myometrial masses, likely representing uterine fibroids. The left adnexa appears unremarkable. 4.2 x 2.9 cm asymmetric soft tissue structure in the right adnexa may represent a right ovarian mass, or abnormal right ovary due to torsion. If clinically indicated,  pelvic ultrasound may be pursued for further evaluation. BOWEL: No bowel obstruction or grossly thickened bowel wall. Appendix within normal limits. PERITONEUM: No free air or ascites. VESSELS: Calcified atherosclerotic disease. RETROPERITONEUM/LYMPH NODES: No lymphadenopathy. ABDOMINAL WALL: Small fat-containing umbilical hernia. BONES: Degenerative joint disease. Age indeterminate compression fracture at the superior endplate of T11 vertebra. IMPRESSION: 4.2 x 2.9 cm asymmetric soft tissue structure in the right adnexa may represent a right ovarian mass, or abnormal right ovary due to torsion.  If clinically indicated, pelvic ultrasound may be pursued for further evaluation. 7 mm enhancing lesion in hepatic segment 5. Apparent 1.1 cm enhancing lesion in the lower pole of the right kidney, suspicious for a renal cell carcinoma. If clinically indicated, abdominal MR without and with IV contrast may be pursued for further evaluation. Age indeterminate compression fracture at the superior endplate of T11 vertebra.  24 TVUS ([facility]) Uterus: 7.6 cm x 4.1 cm x 5.2 cm. multiple fibroids are appreciated.  There is a posterior submucosal fibroid measuring up to 1.4 cm.  Posterior subserosal fibroid is also noted which measures up to 7 cm.  Multiple parenchymal cystic spaces are appreciated and there is diffuse nonspecific heterogeneity of the uterine parenchyma. Endometrium: 7 mm. Tiny cyst is seen within the endometrium. Right ovary: 2.0 cm x 2.3 cm x 2.3 cm. Posterior to the Right Ovary within the Right Adnexa There Is a 4.3 x 3.6 x 3.9 cm septated cyst.  There is some nodularity of the cyst wall. Arterial and venous flow is  demonstrated.This may represent a pedunculated ovarian versus paraovarian  cystic lesion. Left ovary: 1.5 cm x 1.2 cm x 1.1 cm. Within normal limits. Arterial and venous flow is demonstrated. Fluid: None. IMPRESSION: Complex cystic mass of the right adnexa with associated septations and  peripheral nodularity. This may represent a pedunculated ovarian or paraovarian complex cystic mass. Other differential considerations include mesenteric cyst. The origin of this cystic mass is not clearly delineated on this sonogram. This nonetheless has some suspicious features which may indicate a malignant process. Consider further correlation with contrast-enhanced MRI. No sonographic evidence of acute ovarian torsion at the time scanning. Heterogeneous myomatous uterus. Tiny endometrial cyst with the endometrium measuring 7 mm. This is  prominent in size given patient's postmenopausal status. Dedicated GYN assessment is advised and should dictate the need for further imaging or intervention workup.  Labs: 24:   = 8; CEA = 1.0;  = <2; Inhibin B = < 7.0; HbA1c = 5.8;    POB: G6, P4 (2 miscarriages, 4 vaginal deliveries, oldest child is 60 youngest child is in their 40s, patient cannot recall age) PGYN: Menarche age 12, LMP age 38 PMH: Hypertension, urinary incontinence, urge incontinence, hypothyroidism, hyperlipidemia, irritable bowel syndrome Meds: Myrbetriz, pantoprazole, baclofen, Synthroid, Linzess, Kerendia, aspirin 81 mg, atorvastatin, losartan Allergies: NKDA PSH: No past surgical history Social Hx: Non-smoker, no alcohol, no marijuana, no illicit drugs, lives with her sister. FH: Father-throat cancer-age 75; maternal cousin-breast cancer-age 30; maternal first cousin-kidney cancer-age 50; Brother-prostate cancer-age 70.   Health Maintenance: Mammogram: .  Per patient normal. Colonoscopy: .  Per patient normal PAP: 24:  Negative for intraepithelial lesion or malignancy.  Negative HPV ([facility])

## 2024-02-02 NOTE — DISCUSSION/SUMMARY
[Reviewed Clinical Lab Test(s)] : Results of clinical tests were reviewed. [Reviewed Radiology Report(s)] : Radiology reports were reviewed. [Reviewed Radiology Film/Image(s)] : Images from radiology studies were reviewed and examined. [Discuss Alternatives/Risks/Benefits w/Patient] : All alternatives, risks, and benefits were discussed with the patient/family and all questions were answered.  Patient expressed good understanding and appreciates the importance of follow up as recommended. [Visit Time ___ Minutes] : [unfilled] minutes [Face to Face Time___ Minutes] : with [unfilled] minutes in face to face consultation. [FreeTextEntry1] : - Patients history and work up to date reviewed in detail. - Differential diagnosis of this complex pelvic mass discussed in detail including; benign, premalignant and malignant etiologies. The use of diagrams and drawings were used to help facilitate the conversation throughout. We discussed the rationale for a pelvic MRI to help evaluate the pelvic mass better as well as the rationale for MRI abdomen to evaluate the concerning 1.1 cm enhancing lesion in the lower pole of the right kidney, suspicious for a renal cell carcinoma. We discussed the importance of follow up with urology and she requested a female provider urologist. Referral sent to Dr. Alegre (task also sent). - Since her last visit the patient has an appointment with Dr. Alegre from urology and the patient was reminded of this visit which is upcoming on Monday, February 5.  Instructions provided to the patient on where to report for this visit with Dr. Alegre and she expressed verbal understanding.  Additionally the patient had her MRI performed yesterday however the results are still pending and we discussed that we will review this via telemedicine visit once available.  Also we reached out to pathology and her endometrial biopsy results are still pending.  We discussed that at her next telemedicine visit we reviewed the MRI as well as her pending pathology.  Nonetheless we discussed that once her urological issues have been worked up an endometrial biopsy is back to inform us of the potential management needed for this that at least she would likely need surgical management in the form of bilateral salpingo-oophorectomy and D&C but possible hysterectomy depending on what the endometrial biopsy shows.  The patient expressed that she understands the above and that we will have a telemedicine visit in approximately 1 week to review all of her workup thus far and discuss her definitive management plan. - I spent the time noted on the day of this patient encounter preparing for, providing and documenting the above service. I have counseled and educated the patient on the differential, workup, disease course, and treatment/management plan. Education was provided to the patient during this encounter. All questions and concerns were answered and addressed in detail.

## 2024-02-02 NOTE — PHYSICAL EXAM
[Chaperone Present] : A chaperone was present in the examining room during all aspects of the physical examination [FreeTextEntry1] : Grisel CHASE [Normal] : No focal neurologic defects observed [Fully active, able to carry on all pre-disease performance without restriction] : Status 0 - Fully active, able to carry on all pre-disease performance without restriction

## 2024-02-05 ENCOUNTER — APPOINTMENT (OUTPATIENT)
Age: 79
End: 2024-02-05
Payer: MEDICARE

## 2024-02-05 VITALS
HEIGHT: 60 IN | HEART RATE: 69 BPM | DIASTOLIC BLOOD PRESSURE: 64 MMHG | BODY MASS INDEX: 32.98 KG/M2 | SYSTOLIC BLOOD PRESSURE: 127 MMHG | TEMPERATURE: 98 F | WEIGHT: 168 LBS | OXYGEN SATURATION: 97 %

## 2024-02-05 PROCEDURE — 99214 OFFICE O/P EST MOD 30 MIN: CPT

## 2024-02-05 NOTE — HISTORY OF PRESENT ILLNESS
[FreeTextEntry1] : 77 yo Bel[place]ian speaking  previously seen by Dr. Renteria for incontinence and microhematuria still on myrbetriq for LUTS Recently presented to ER with vaginal bleeding CT was concerning for right ovarian mass as well as a small right renal mass Subsequent MRI confirmed findings No flank pain but does have low back pain

## 2024-02-05 NOTE — ASSESSMENT
[FreeTextEntry1] : 79 yo M with right renal mass  - Reviewed all available imaging through PACS including CT from ER visit and recent MRI and confirmed findings as written above - Discussed options with pt including active surveillance vs perc ablation vs surgical extirpation. Reviewed the pros and cons of each option and given size of mass, would recommend active surveillance at this time. - FU in 3 months for renal US

## 2024-02-06 ENCOUNTER — APPOINTMENT (OUTPATIENT)
Dept: GYNECOLOGIC ONCOLOGY | Facility: CLINIC | Age: 79
End: 2024-02-06
Payer: MEDICARE

## 2024-02-06 DIAGNOSIS — N85.02 ENDOMETRIAL INTRAEPITHELIAL NEOPLASIA [EIN]: ICD-10-CM

## 2024-02-06 DIAGNOSIS — R19.00 INTRA-ABDOMINAL AND PELVIC SWELLING, MASS AND LUMP, UNSPECIFIED SITE: ICD-10-CM

## 2024-02-06 PROBLEM — N95.0 POSTMENOPAUSAL BLEEDING: Status: ACTIVE | Noted: 2024-02-06

## 2024-02-06 PROCEDURE — 99213 OFFICE O/P EST LOW 20 MIN: CPT

## 2024-02-06 RX ORDER — MEDROXYPROGESTERONE ACETATE 10 MG/1
10 TABLET ORAL DAILY
Qty: 30 | Refills: 2 | Status: ACTIVE | COMMUNITY
Start: 2024-02-06 | End: 1900-01-01

## 2024-02-06 NOTE — DISCUSSION/SUMMARY
[Reviewed Clinical Lab Test(s)] : Results of clinical tests were reviewed. [Reviewed Radiology Report(s)] : Radiology reports were reviewed. [Reviewed Radiology Film/Image(s)] : Images from radiology studies were reviewed and examined. [Discuss Alternatives/Risks/Benefits w/Patient] : All alternatives, risks, and benefits were discussed with the patient/family and all questions were answered.  Patient expressed good understanding and appreciates the importance of follow up as recommended. [Visit Time ___ Minutes] : [unfilled] minutes [Face to Face Time___ Minutes] : with [unfilled] minutes in face to face consultation. [FreeTextEntry1] : - Patients history and work up to date reviewed in detail. - Today, she presents via telemedicine to review her workup to date as well as management plan options. We reviewed her EMB demonstrating atrophic endometrium with focal atypia but further sampling recommended. Additionally, we reviewed her MRI A/P with the right complex adnexal mass. We reviewed Dr. Alegre's workup for the renal mass with no surgical/IR intervention at this time with close interval surveillance follow up with imaging. We discussed the surgical management options and she desires to proceed with definitive surgical management after all the risks/benefits/alternatives were discussed. Additionally, she notes ongoing vaginal bleeding (~3 pads changed yesterday). We discussed initiation of provera prior to her procedure and she is in agreement. Bleeding precautions provided and when to seek evaluation sooner. She expressed verbal understanding.  - We discussed risks and benefits of surgery as well as the typical post operative course.  We also discussed potential alternatives to surgery. Ample time was allowed for questions to be asked and solicited.  All were answered and the patient expressed understanding.    We discussed the risks of surgery which include, but are not limited to:  -Bleeding that may require a blood transfusion  -Infection of the surgical incision sites, lungs, urine, kidneys or IV site that may can compromise healing and require IV antibiotics  -Injury to surrounding structures including the bladder, bowel, ureters, urethra, blood vessels, or nerves that may result in a loss of function or sensation.  -Blood clots in the extremities or lungs, which may lead to long term anticoagulation and difficulty breathing -The need for more surgery  After our discussion, all questions were again answered. She is aware of the risks and benefits and would like to proceed.  Patient will be seen by our anesthesia colleagues in pre-admission testing and have preoperative labs drawn.  - Booking form placed for next available date.  - Patient aware she needs perioperative optimization recommendations and medical clearances. - I spent the time noted on the day of this patient encounter preparing for, providing and documenting the above service. I have counseled and educated the patient on the differential, workup, disease course, and treatment/management plan. Education was provided to the patient during this encounter. All questions and concerns were answered and addressed in detail.

## 2024-02-06 NOTE — REASON FOR VISIT
[Home] : at home, [unfilled] , at the time of the visit. [Medical Office: ([facility])___] : at the medical office located in  [Patient] : the patient [Self] : self [This encounter was initiated by telehealth (audio with video) and converted to telephone (audio only) due to technical difficulties.] : This encounter was initiated by telehealth (audio with video) and converted to telephone (audio only) due to technical difficulties. [FreeTextEntry1] :   follow-up - pelvic mass, PMB  NPA visit 1/19/2024

## 2024-02-06 NOTE — HISTORY OF PRESENT ILLNESS
[FreeTextEntry1] : ref:  [facility] ED  PCP:Antionette Morton MD [phone] GI:  Cannot recall name of GI.  Last colonoscopy  in [place] Cards:  Matt Angulo MD  Patient is Danish-speaking only. Patient presents with her sister, Zhanna, during today's visit.  Ms. Salazar, 78 years old, states she had postmenopausal bleeding x 4 days and presented to the ED on 2024.  Workup included imaging identifying right ovarian mass, thickened endometrium, as well as a right kidney mass concerning for RCC.  She was referred for further management.  Denies f/c/n/v/d.  Denies changes to bowel or bladder habits; denies unexplained weight loss or gain.  She does report 3 months of pelvic pain and low back pain.  She does have chronic issues of irritable bowel, as well as urinary urgency and frequency as well as urinary incontinence.  The patient's only issue is some mild vaginal spotting. Since her last visit the patient has an appointment with Dr. Alegre from urology and the patient was reminded of this visit which is upcoming on Monday, .  Instructions provided to the patient on where to report for this visit with Dr. Alegre and she expressed verbal understanding.  Additionally the patient had her MRI performed yesterday however the results are still pending and we discussed that we will review this via telemedicine visit once available.  Also we reached out to pathology and her endometrial biopsy results are still pending.  We discussed that at her next telemedicine visit we reviewed the MRI as well as her pending pathology.  Nonetheless we discussed that once her urological issues have been worked up an endometrial biopsy is back to inform us of the potential management needed for this that at least she would likely need surgical management in the form of bilateral salpingo-oophorectomy and D&C but possible hysterectomy depending on what the endometrial biopsy shows.  The patient expressed that she understands the above and that we will have a telemedicine visit once all workup was completed.  Today, she presents via telemedicine to review her workup to date as well as management plan options. We reviewed her EMB demonstrating atrophic endometrium with focal atypia but further sampling recommended. Additionally, we reviewed her MRI A/P with the right complex adnexal mass. We reviewed Dr. Alegre's workup for the renal mass with no surgical/IR intervention at this time with close interval surveillance follow up with imaging. We discussed the surgical management options and she desires to proceed with definitive surgical management after all the risks/benefits/alternatives were discussed. Additionally, she notes ongoing vaginal bleeding (~3 pads changed yesterday). We discussed initiation of provera prior to her procedure and she is in agreement. Bleeding precautions provided and when to seek evaluation sooner. She expressed verbal understanding.   2024 [facility] ED  Chief Complaint: The patient is a 78y Female complaining of vaginal bleeding.  HPI Objective Statement: 78-year-old female history of hypertension, hyperlipidemia, presenting with chief complaint of vaginal bleeding and lower abdominal pain since yesterday.  Patient is denying any chest pain, lightheadedness, syncope, or dizziness.  No nausea, vomiting, fevers or chills. meds: aspirin, statin, losartan, pantoprazole, baclofen  NKDA  Imagin2024 CT A/P ([facility]) LOWER CHEST: Small bilateral atelectasis. LIVER: Hepatic steatosis. A few small hypodense lesions in the liver, too small to characterize. 7 mm enhancing lesion in hepatic segment 5 (image 51 series 2); if clinically indicated, abdominal MR without and with IV contrast may be pursued for further evaluation. BILE DUCTS: Normal caliber. GALLBLADDER: Within normal limits. SPLEEN: Within normal limits. PANCREAS: Within normal limits. ADRENALS: Within normal limits. KIDNEYS/URETERS: 1.9 cm left renal cyst. Apparent 1.1 cm enhancing lesion in the lower pole of the right kidney, suspicious for a renal cell carcinoma. No evidence for a ureteral calculus. No hydronephrosis. BLADDER: Within normal limits. REPRODUCTIVE ORGANS: A few myometrial masses, likely representing uterine fibroids. The left adnexa appears unremarkable. 4.2 x 2.9 cm asymmetric soft tissue structure in the right adnexa may represent a right ovarian mass, or abnormal right ovary due to torsion. If clinically indicated,  pelvic ultrasound may be pursued for further evaluation. BOWEL: No bowel obstruction or grossly thickened bowel wall. Appendix within normal limits. PERITONEUM: No free air or ascites. VESSELS: Calcified atherosclerotic disease. RETROPERITONEUM/LYMPH NODES: No lymphadenopathy. ABDOMINAL WALL: Small fat-containing umbilical hernia. BONES: Degenerative joint disease. Age indeterminate compression fracture at the superior endplate of T11 vertebra. IMPRESSION: 4.2 x 2.9 cm asymmetric soft tissue structure in the right adnexa may represent a right ovarian mass, or abnormal right ovary due to torsion.  If clinically indicated, pelvic ultrasound may be pursued for further evaluation. 7 mm enhancing lesion in hepatic segment 5. Apparent 1.1 cm enhancing lesion in the lower pole of the right kidney, suspicious for a renal cell carcinoma. If clinically indicated, abdominal MR without and with IV contrast may be pursued for further evaluation. Age indeterminate compression fracture at the superior endplate of T11 vertebra.  24 TVUS ([facility]) Uterus: 7.6 cm x 4.1 cm x 5.2 cm. multiple fibroids are appreciated.  There is a posterior submucosal fibroid measuring up to 1.4 cm.  Posterior subserosal fibroid is also noted which measures up to 7 cm.  Multiple parenchymal cystic spaces are appreciated and there is diffuse nonspecific heterogeneity of the uterine parenchyma. Endometrium: 7 mm. Tiny cyst is seen within the endometrium. Right ovary: 2.0 cm x 2.3 cm x 2.3 cm. Posterior to the Right Ovary within the Right Adnexa There Is a 4.3 x 3.6 x 3.9 cm septated cyst.  There is some nodularity of the cyst wall. Arterial and venous flow is  demonstrated.This may represent a pedunculated ovarian versus paraovarian  cystic lesion. Left ovary: 1.5 cm x 1.2 cm x 1.1 cm. Within normal limits. Arterial and venous flow is demonstrated. Fluid: None. IMPRESSION: Complex cystic mass of the right adnexa with associated septations and  peripheral nodularity. This may represent a pedunculated ovarian or paraovarian complex cystic mass. Other differential considerations include mesenteric cyst. The origin of this cystic mass is not clearly delineated on this sonogram. This nonetheless has some suspicious features which may indicate a malignant process. Consider further correlation with contrast-enhanced MRI. No sonographic evidence of acute ovarian torsion at the time scanning. Heterogeneous myomatous uterus. Tiny endometrial cyst with the endometrium measuring 7 mm. This is  prominent in size given patient's postmenopausal status. Dedicated GYN assessment is advised and should dictate the need for further imaging or intervention workup.  Labs: 24:   = 8; CEA = 1.0;  = <2; Inhibin B = < 7.0; HbA1c = 5.8;    POB: G6, P4 (2 miscarriages, 4 vaginal deliveries, oldest child is 60 youngest child is in their 40s, patient cannot recall age) PGYN: Menarche age 12, LMP age 38 PMH: Hypertension, urinary incontinence, urge incontinence, hypothyroidism, hyperlipidemia, irritable bowel syndrome Meds: Myrbetriz, pantoprazole, baclofen, Synthroid, Linzess, Kerendia, aspirin 81 mg, atorvastatin, losartan Allergies: NKDA PSH: No past surgical history Social Hx: Non-smoker, no alcohol, no marijuana, no illicit drugs, lives with her sister. FH: Father-throat cancer-age 75; maternal cousin-breast cancer-age 30; maternal first cousin-kidney cancer-age 50; Brother-prostate cancer-age 70.   Health Maintenance: Mammogram: .  Per patient normal. Colonoscopy: .  Per patient normal PAP: 24:  Negative for intraepithelial lesion or malignancy.  Negative HPV ([facility])

## 2024-02-26 ENCOUNTER — OUTPATIENT (OUTPATIENT)
Dept: OUTPATIENT SERVICES | Facility: HOSPITAL | Age: 79
LOS: 1 days | End: 2024-02-26
Payer: COMMERCIAL

## 2024-02-26 VITALS
DIASTOLIC BLOOD PRESSURE: 80 MMHG | HEIGHT: 60 IN | RESPIRATION RATE: 15 BRPM | OXYGEN SATURATION: 98 % | HEART RATE: 76 BPM | WEIGHT: 171.96 LBS | SYSTOLIC BLOOD PRESSURE: 167 MMHG | TEMPERATURE: 99 F

## 2024-02-26 DIAGNOSIS — C54.1 MALIGNANT NEOPLASM OF ENDOMETRIUM: ICD-10-CM

## 2024-02-26 DIAGNOSIS — K21.9 GASTRO-ESOPHAGEAL REFLUX DISEASE WITHOUT ESOPHAGITIS: ICD-10-CM

## 2024-02-26 DIAGNOSIS — N95.0 POSTMENOPAUSAL BLEEDING: ICD-10-CM

## 2024-02-26 DIAGNOSIS — Z01.818 ENCOUNTER FOR OTHER PREPROCEDURAL EXAMINATION: ICD-10-CM

## 2024-02-26 DIAGNOSIS — E03.9 HYPOTHYROIDISM, UNSPECIFIED: ICD-10-CM

## 2024-02-26 DIAGNOSIS — R19.00 INTRA-ABDOMINAL AND PELVIC SWELLING, MASS AND LUMP, UNSPECIFIED SITE: ICD-10-CM

## 2024-02-26 DIAGNOSIS — N85.02 ENDOMETRIAL INTRAEPITHELIAL NEOPLASIA [EIN]: ICD-10-CM

## 2024-02-26 DIAGNOSIS — I10 ESSENTIAL (PRIMARY) HYPERTENSION: ICD-10-CM

## 2024-02-26 LAB — BLD GP AB SCN SERPL QL: SIGNIFICANT CHANGE UP

## 2024-02-26 NOTE — H&P PST ADULT - PROBLEM SELECTOR PLAN 1
Pt schedule for Robotic Assisted Total Laparoscopic Hysterectomy with Bilateral Salpingo-oophorectomy and sentinel Lymph Node Mapping Possible Staging on 3/7/24 with Dr Clifton.     Labs drawn by PCP - will f/u result  Pt was seen by PCP for Medical clearance - will f/u report    Pt was  instructed to stop aspirin/ecotrin and all over the counter medication including vitamins and herbal supplements one week prior to surgery   Instructions given on the use of 4% chlorhexidine wash and Pt verbalized understanding of same   Pt Instructed to have nothing by mouth starting midnight day before surgery  Patient is to expect a phone call day before surgery between the hours of 430- 630pm giving arrival time for surgery   Written and verbal preoperative instructions given to patient with understanding verbalized.     Patient today with STOP bang score 3  Low  risk for IVIS Pt schedule for Robotic Assisted Total Laparoscopic Hysterectomy with Bilateral Salpingo-oophorectomy and sentinel Lymph Node Mapping Possible Staging on 3/7/24 with Dr Clifton.     Labs drawn by PCP - result in chart   Pt was seen by PCP for Medical clearance 2/22/24- will f/u report    Pt was  instructed to stop aspirin/ecotrin and all over the counter medication including vitamins and herbal supplements one week prior to surgery   Instructions given on the use of 4% chlorhexidine wash and Pt verbalized understanding of same   Pt Instructed to have nothing by mouth starting midnight day before surgery  Patient is to expect a phone call day before surgery between the hours of 430- 630pm giving arrival time for surgery   Written and verbal preoperative instructions given to patient with understanding verbalized via interpretation done by sister - Zhanna- [phone] as requested.     Patient today with STOP bang score 3  Low  risk for IVIS

## 2024-02-26 NOTE — H&P PST ADULT - HISTORY OF PRESENT ILLNESS
78 y.o female with PMHx for GERD, IBS, HLD, HTN, Hypothyroidism. C/o of abnormal vaginal bleeding. 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  78 y.o female with PMHx for GERD, IBS, HLD, HTN, Hypothyroidism. C/o of vaginal bleeding for one month.  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

## 2024-02-26 NOTE — H&P PST ADULT - REASON FOR ADMISSION
Robotic Assisted Total Laparoscopic Hysterectomy with Bilateral Salpingo-oophorectomy and sentinel Lymph Node Mapping Possible Staging  on 3/7/24 with Dr Clifton.

## 2024-02-26 NOTE — H&P PST ADULT - ASSESSMENT
Robotic Assisted Total Laparoscopic Hysterectomy with Bilateral Salpingo-oophorectomy and sentinel Lymph Node Mapping Possible Staging  on 3/7/24 with Dr Clifton.  78 y.o female with Endometrial Intraepithelial Neoplasm and Pelvic mass and now schedule for Robotic Assisted Total Laparoscopic Hysterectomy with Bilateral Salpingo-oophorectomy and sentinel Lymph Node Mapping Possible Staging on 3/7/24 with Dr Clifton.     DORA spaulding score 3

## 2024-02-26 NOTE — H&P PST ADULT - NSANTHOSAYNRD_GEN_A_CORE
No. IVIS screening performed.  STOP BANG Legend: 0-2 = LOW Risk; 3-4 = INTERMEDIATE Risk; 5-8 = HIGH Risk

## 2024-02-26 NOTE — H&P PST ADULT - NSICDXPASTMEDICALHX_GEN_ALL_CORE_FT
PAST MEDICAL HISTORY:  GERD (gastroesophageal reflux disease)     History of IBS     HLD (hyperlipidemia)     HTN (hypertension)     Hypothyroidism      PAST MEDICAL HISTORY:  GERD (gastroesophageal reflux disease)     History of IBS     HLD (hyperlipidemia)     HTN (hypertension)     Hypothyroidism     Postmenopausal bleeding

## 2024-02-26 NOTE — H&P PST ADULT - TEMPERATURE IN FAHRENHEIT (DEGREES F)
Patient Information    Lab -- Fasting labwork has been ordered for you to have done in 6 months   Fasting Labs Diet Restrictions  Please come prior to your next appointment to recheck fasting labs.  Please come fasting (at least 8 hours) to your next appointment to recheck labs.  Please drink plenty of water before your appointment.  You can take any prescribed or routine medicine with water before your appointment.  You can brush your teeth even if you are fasting.    Follow Up  -- Follow up with your regular Primary Care Provider IN 6 MONTHS   -- You have been referred to BARIATRIC SURGERY [phone]. Please call if you have not heard from that department in 3 days.     Additional Educational Resources:  For additional resources regarding your symptoms, diagnosis, or further health information, please visit the Health Resources section on [email or web address] or the Online Health Resources section in [facility].     98.6

## 2024-02-26 NOTE — H&P PST ADULT - GENITOURINARY COMMENTS
post menopausal bleeding, now scheudule for surgery post menopausal bleeding, now schedule for surgery

## 2024-02-26 NOTE — H&P PST ADULT - PAIN ASSESSMENT COMPLETED
9/25/19 Patient: Rosa Isela Woodard YOB: 1958 Date of Visit: 9/25/2019 Rylie Roberts MD 
[address] [facility] [address] [address] [place] [address] VIA Facsimile: [phone] Dear Rylie Roberts MD, Thank you for referring Ms. Rosa Isela Woodard to [address] [place] [address] for evaluation. My notes for this consultation are attached. If you have questions, please do not hesitate to call me. I look forward to following your patient along with you. Sincerely, Kuldeep Warren MD 
 
 Yes

## 2024-02-26 NOTE — H&P PST ADULT - NSICDXPROCEDURE_GEN_ALL_CORE_FT
PROCEDURES:  Laparoscopic hysterectomy, total, with BSO, uterus 250 g or less 26-Feb-2024 16:17:45  Aric Degroot

## 2024-02-29 PROCEDURE — G0463: CPT

## 2024-03-06 ENCOUNTER — TRANSCRIPTION ENCOUNTER (OUTPATIENT)
Age: 79
End: 2024-03-06

## 2024-03-07 ENCOUNTER — APPOINTMENT (OUTPATIENT)
Dept: GYNECOLOGIC ONCOLOGY | Facility: HOSPITAL | Age: 79
End: 2024-03-07

## 2024-03-07 ENCOUNTER — INPATIENT (INPATIENT)
Facility: HOSPITAL | Age: 79
LOS: 0 days | Discharge: HOME | DRG: 743 | End: 2024-03-08
Attending: OBSTETRICS & GYNECOLOGY | Admitting: OBSTETRICS & GYNECOLOGY
Payer: COMMERCIAL

## 2024-03-07 ENCOUNTER — RESULT REVIEW (OUTPATIENT)
Age: 79
End: 2024-03-07

## 2024-03-07 VITALS
DIASTOLIC BLOOD PRESSURE: 83 MMHG | WEIGHT: 171.96 LBS | HEIGHT: 60 IN | RESPIRATION RATE: 16 BRPM | OXYGEN SATURATION: 99 % | HEART RATE: 70 BPM | TEMPERATURE: 98 F | SYSTOLIC BLOOD PRESSURE: 154 MMHG

## 2024-03-07 DIAGNOSIS — Z90.710 ACQUIRED ABSENCE OF BOTH CERVIX AND UTERUS: ICD-10-CM

## 2024-03-07 DIAGNOSIS — Z01.818 ENCOUNTER FOR OTHER PREPROCEDURAL EXAMINATION: ICD-10-CM

## 2024-03-07 DIAGNOSIS — N95.0 POSTMENOPAUSAL BLEEDING: ICD-10-CM

## 2024-03-07 DIAGNOSIS — N85.02 ENDOMETRIAL INTRAEPITHELIAL NEOPLASIA [EIN]: ICD-10-CM

## 2024-03-07 DIAGNOSIS — R19.00 INTRA-ABDOMINAL AND PELVIC SWELLING, MASS AND LUMP, UNSPECIFIED SITE: ICD-10-CM

## 2024-03-07 LAB
ANION GAP SERPL CALC-SCNC: 4 MMOL/L — LOW (ref 5–17)
BLD GP AB SCN SERPL QL: SIGNIFICANT CHANGE UP
BUN SERPL-MCNC: 16 MG/DL — SIGNIFICANT CHANGE UP (ref 7–18)
CALCIUM SERPL-MCNC: 8.5 MG/DL — SIGNIFICANT CHANGE UP (ref 8.4–10.5)
CHLORIDE SERPL-SCNC: 107 MMOL/L — SIGNIFICANT CHANGE UP (ref 96–108)
CO2 SERPL-SCNC: 27 MMOL/L — SIGNIFICANT CHANGE UP (ref 22–31)
CREAT SERPL-MCNC: 1.15 MG/DL — SIGNIFICANT CHANGE UP (ref 0.5–1.3)
EGFR: 48 ML/MIN/1.73M2 — LOW
GLUCOSE SERPL-MCNC: 120 MG/DL — HIGH (ref 70–99)
HCT VFR BLD CALC: 39.6 % — SIGNIFICANT CHANGE UP (ref 34.5–45)
HGB BLD-MCNC: 13 G/DL — SIGNIFICANT CHANGE UP (ref 11.5–15.5)
MCHC RBC-ENTMCNC: 31.8 PG — SIGNIFICANT CHANGE UP (ref 27–34)
MCHC RBC-ENTMCNC: 32.8 GM/DL — SIGNIFICANT CHANGE UP (ref 32–36)
MCV RBC AUTO: 96.8 FL — SIGNIFICANT CHANGE UP (ref 80–100)
NRBC # BLD: 0 /100 WBCS — SIGNIFICANT CHANGE UP (ref 0–0)
PLATELET # BLD AUTO: 175 K/UL — SIGNIFICANT CHANGE UP (ref 150–400)
POTASSIUM SERPL-MCNC: 4 MMOL/L — SIGNIFICANT CHANGE UP (ref 3.5–5.3)
POTASSIUM SERPL-SCNC: 4 MMOL/L — SIGNIFICANT CHANGE UP (ref 3.5–5.3)
RBC # BLD: 4.09 M/UL — SIGNIFICANT CHANGE UP (ref 3.8–5.2)
RBC # FLD: 14.9 % — HIGH (ref 10.3–14.5)
SODIUM SERPL-SCNC: 138 MMOL/L — SIGNIFICANT CHANGE UP (ref 135–145)
TROPONIN I, HIGH SENSITIVITY RESULT: 5.2 NG/L — SIGNIFICANT CHANGE UP
WBC # BLD: 16.02 K/UL — HIGH (ref 3.8–10.5)
WBC # FLD AUTO: 16.02 K/UL — HIGH (ref 3.8–10.5)

## 2024-03-07 PROCEDURE — 99232 SBSQ HOSP IP/OBS MODERATE 35: CPT | Mod: GC

## 2024-03-07 PROCEDURE — 88305 TISSUE EXAM BY PATHOLOGIST: CPT | Mod: 26

## 2024-03-07 PROCEDURE — 88307 TISSUE EXAM BY PATHOLOGIST: CPT | Mod: 26

## 2024-03-07 PROCEDURE — 88112 CYTOPATH CELL ENHANCE TECH: CPT | Mod: 26

## 2024-03-07 PROCEDURE — 88342 IMHCHEM/IMCYTCHM 1ST ANTB: CPT | Mod: 26

## 2024-03-07 PROCEDURE — 93010 ELECTROCARDIOGRAM REPORT: CPT

## 2024-03-07 PROCEDURE — 58571 TLH W/T/O 250 G OR LESS: CPT

## 2024-03-07 PROCEDURE — 52000 CYSTOURETHROSCOPY: CPT | Mod: 59,XU

## 2024-03-07 DEVICE — VISTASEAL FIBRIN HUMAN 10ML: Type: IMPLANTABLE DEVICE | Status: FUNCTIONAL

## 2024-03-07 RX ORDER — PANTOPRAZOLE SODIUM 20 MG/1
40 TABLET, DELAYED RELEASE ORAL
Refills: 0 | Status: DISCONTINUED | OUTPATIENT
Start: 2024-03-07 | End: 2024-03-08

## 2024-03-07 RX ORDER — OXYCODONE AND ACETAMINOPHEN 5; 325 MG/1; MG/1
1 TABLET ORAL EVERY 4 HOURS
Refills: 0 | Status: DISCONTINUED | OUTPATIENT
Start: 2024-03-07 | End: 2024-03-08

## 2024-03-07 RX ORDER — MORPHINE SULFATE 50 MG/1
2 CAPSULE, EXTENDED RELEASE ORAL EVERY 4 HOURS
Refills: 0 | Status: DISCONTINUED | OUTPATIENT
Start: 2024-03-07 | End: 2024-03-08

## 2024-03-07 RX ORDER — LEVOTHYROXINE SODIUM 125 MCG
100 TABLET ORAL DAILY
Refills: 0 | Status: DISCONTINUED | OUTPATIENT
Start: 2024-03-07 | End: 2024-03-08

## 2024-03-07 RX ORDER — FINERENONE 20 MG/1
2 TABLET, FILM COATED ORAL
Refills: 0 | DISCHARGE

## 2024-03-07 RX ORDER — HYDROMORPHONE HYDROCHLORIDE 2 MG/ML
0.5 INJECTION INTRAMUSCULAR; INTRAVENOUS; SUBCUTANEOUS
Refills: 0 | Status: DISCONTINUED | OUTPATIENT
Start: 2024-03-07 | End: 2024-03-07

## 2024-03-07 RX ORDER — SODIUM CHLORIDE 9 MG/ML
3 INJECTION INTRAMUSCULAR; INTRAVENOUS; SUBCUTANEOUS EVERY 8 HOURS
Refills: 0 | Status: DISCONTINUED | OUTPATIENT
Start: 2024-03-07 | End: 2024-03-07

## 2024-03-07 RX ORDER — SODIUM CHLORIDE 9 MG/ML
1000 INJECTION, SOLUTION INTRAVENOUS
Refills: 0 | Status: DISCONTINUED | OUTPATIENT
Start: 2024-03-07 | End: 2024-03-08

## 2024-03-07 RX ORDER — SODIUM CHLORIDE 9 MG/ML
1000 INJECTION, SOLUTION INTRAVENOUS
Refills: 0 | Status: DISCONTINUED | OUTPATIENT
Start: 2024-03-07 | End: 2024-03-07

## 2024-03-07 RX ORDER — CHLORHEXIDINE GLUCONATE 213 G/1000ML
1 SOLUTION TOPICAL ONCE
Refills: 0 | Status: COMPLETED | OUTPATIENT
Start: 2024-03-07 | End: 2024-03-07

## 2024-03-07 RX ORDER — ONDANSETRON 8 MG/1
4 TABLET, FILM COATED ORAL ONCE
Refills: 0 | Status: DISCONTINUED | OUTPATIENT
Start: 2024-03-07 | End: 2024-03-07

## 2024-03-07 RX ORDER — FENTANYL CITRATE 50 UG/ML
25 INJECTION INTRAVENOUS
Refills: 0 | Status: DISCONTINUED | OUTPATIENT
Start: 2024-03-07 | End: 2024-03-07

## 2024-03-07 RX ORDER — ASPIRIN/CALCIUM CARB/MAGNESIUM 324 MG
1 TABLET ORAL
Refills: 0 | DISCHARGE

## 2024-03-07 RX ORDER — LOSARTAN POTASSIUM 100 MG/1
50 TABLET, FILM COATED ORAL DAILY
Refills: 0 | Status: DISCONTINUED | OUTPATIENT
Start: 2024-03-07 | End: 2024-03-08

## 2024-03-07 RX ORDER — ATORVASTATIN CALCIUM 80 MG/1
20 TABLET, FILM COATED ORAL AT BEDTIME
Refills: 0 | Status: DISCONTINUED | OUTPATIENT
Start: 2024-03-07 | End: 2024-03-08

## 2024-03-07 RX ADMIN — MORPHINE SULFATE 2 MILLIGRAM(S): 50 CAPSULE, EXTENDED RELEASE ORAL at 22:05

## 2024-03-07 RX ADMIN — MORPHINE SULFATE 2 MILLIGRAM(S): 50 CAPSULE, EXTENDED RELEASE ORAL at 21:50

## 2024-03-07 RX ADMIN — HYDROMORPHONE HYDROCHLORIDE 0.5 MILLIGRAM(S): 2 INJECTION INTRAMUSCULAR; INTRAVENOUS; SUBCUTANEOUS at 11:29

## 2024-03-07 RX ADMIN — HYDROMORPHONE HYDROCHLORIDE 0.5 MILLIGRAM(S): 2 INJECTION INTRAMUSCULAR; INTRAVENOUS; SUBCUTANEOUS at 11:44

## 2024-03-07 RX ADMIN — CHLORHEXIDINE GLUCONATE 1 APPLICATION(S): 213 SOLUTION TOPICAL at 06:39

## 2024-03-07 RX ADMIN — ATORVASTATIN CALCIUM 20 MILLIGRAM(S): 80 TABLET, FILM COATED ORAL at 21:43

## 2024-03-07 NOTE — BRIEF OPERATIVE NOTE - NSICDXBRIEFPROCEDURE_GEN_ALL_CORE_FT
PROCEDURES:  Total abdominal hysterectomy 07-Mar-2024 11:02:13  Adrienne Martinez  Hysteroscopy, robot-assisted 07-Mar-2024 11:03:16  Adrienne Martinez  Bilateral salpingoophorectomy 07-Mar-2024 11:03:26  Adrienne Martinez  [place] lymph node mapping 07-Mar-2024 11:03:35  Adrienne Martinez  Lymphadenectomy, simple 07-Mar-2024 11:04:37  Adrienne Martinez  Cystoscopy 07-Mar-2024 11:05:02  Adrienne Martinez

## 2024-03-07 NOTE — BRIEF OPERATIVE NOTE - NSICDXBRIEFPREOP_GEN_ALL_CORE_FT
PRE-OP DIAGNOSIS:  Endometrial hyperplasia 07-Mar-2024 10:59:25  Adrienne Martinez  Postmenopausal vaginal bleeding 07-Mar-2024 11:00:32  Adrienne Martinez

## 2024-03-07 NOTE — BRIEF OPERATIVE NOTE - OPERATION/FINDINGS
Robotic assisted TLH-BSO, sentinel lymph node mapping with ICG; sentinel lymph node sampling; cystoscopy; Bilateral TAP block

## 2024-03-07 NOTE — ASU PATIENT PROFILE, ADULT - REASON FOR ADMISSION, PROFILE
Robotic Assisted Total Laparoscopic Hysterectomy with Bilateral Salpingo-oophorectomy and sentinel Lymph Node Mapping Possible Staging

## 2024-03-07 NOTE — PROGRESS NOTE ADULT - ASSESSMENT
a/p: 78y F POD#0 s/p robotic assisted total laparoscopic hysterectomy, bilateral salpingo-oophorectomy with pelvic sentinel lymph node mapping, cystoscopy, TAP block. T wave inversions found on EKG.  - advance diet to clear diet  - nye d/c in AM  - cbc/bmp in AM  - medicine consult for TWI, recommendations appreciated  - cardio consulted  - pain management pr pt request  - cont current management  - cont home meds  - plan for d/c tomorrow  - d/w Dr. Torres

## 2024-03-07 NOTE — BRIEF OPERATIVE NOTE - NSICDXBRIEFPOSTOP_GEN_ALL_CORE_FT
POST-OP DIAGNOSIS:  Endometrial hyperplasia 07-Mar-2024 11:00:58  Adrienne Martinez  Postmenopausal vaginal bleeding 07-Mar-2024 11:01:16  Adrienne Martinez  Postmenopausal vaginal bleeding 07-Mar-2024 11:01:21  Adrienne Martinez   abscess to R buttocks

## 2024-03-07 NOTE — CONSULT NOTE ADULT - ASSESSMENT
77 y/o F who presents with a chief complaint of 78 y.o female with PMHx for GERD, IBS, HLD, HTN, Hypothyroidism. C/o of vaginal bleeding for one month.  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 Medicine consulted due to new TWI on EKG.    #CAD (r/o)  #Endometrial Hyperplasia  #Ovarian Mass  #HTN  #HLD  #Hypothyroidism  #Leukocytosis    #CAD  - Hx of CAD  - s/p TAHBSO  - EKG - sinus bradycardia, TWI leads aVL, V2-V6 (anterolateral leads)  - Trop - neg  - denies any chest pain, shortness of breath, palpitation  - Admit to Telemetry  - continue home med - ASA, atorvastatin  - recent stress test done outpatient (Dr. Angulo - [phone])  - outpatient EKG - NSR,   - NSSTTC (2/27/2024) non ischemic  Observe      #Endometrial Hyperplasia  #Ovarian Mass  - s/p robotic TAHBSO (3/8/2024)  - rest of plan per Primary Team (GYN)    #HTN  - home med - losartan  - BP monitoring  - continue home med    #HLD  - continue home med - atorvastatin    #Hypothyroidism  - continue home med - synthroid    #BPH  - continue home med - tamsulosin    #Leukocytosis  - WBC - 16  - likely reactive post-op  - non-septic, afebrile  - hold ABx for now  - monitor CBC

## 2024-03-07 NOTE — PROGRESS NOTE ADULT - SUBJECTIVE AND OBJECTIVE BOX
EDDA CANTU NOTE  POD#0  Patient seen at bedside with sister to alysia. Patient is doing well, endorses abdominal pain 4/10, controlled with pain medications. She is not currently ambulating at this time, nye in place draining clear urine. She is not eating/drinking at the moment but states she is hungry. Denies headache, dizziness, fever/chills, chest pain, shortness of breath, n/v/d, worsening abdominal pain, pelvic pain, vaginal bleeding, lower ext pain/swelling.     ICU Vital Signs Last 24 Hrs  T(C): 36.4 (07 Mar 2024 14:40), Max: 36.7 (07 Mar 2024 06:36)  T(F): 97.5 (07 Mar 2024 14:40), Max: 98.1 (07 Mar 2024 06:36)  HR: 66 (07 Mar 2024 14:40) (51 - 70)  BP: 127/59 (07 Mar 2024 14:40) (95/43 - 154/83)  BP(mean): 79 (07 Mar 2024 14:40) (56 - 84)  ABP: --  ABP(mean): --  RR: 13 (07 Mar 2024 14:40) (11 - 16)  SpO2: 97% (07 Mar 2024 14:40) (97% - 100%)    O2 Parameters below as of 07 Mar 2024 14:40  Patient On (Oxygen Delivery Method): room air    gen: a+ox4, nad  chest: +s1/s2, lung cta b/l  abd: bs+, soft distension post-op, appropriately tender post-op, incisions c/d/i, no guarding or rigidity  pelvic: no vaginal bleeding, nye in place  ext: no edema or tenderness                          13.0   16.02 )-----------( 175      ( 07 Mar 2024 13:09 )             39.6   03-07    138  |  107  |  16  ----------------------------<  120<H>  4.0   |  27  |  1.15    Ca    8.5      07 Mar 2024 13:09    Troponin I, High Sensitivity Result: 5.2: Serial measurements of high sensitivity troponin I (hs TnI) showing rapid   upward or downward changes suggest acute myocardial injury. Please note   that a sustained elevation of hsTnI can be caused by renal disease,   chronic heart failure, sepsis, pulmonary embolism and other clinical   conditions. ng/L (03.07.24 @ 13:09)

## 2024-03-07 NOTE — ASU PATIENT PROFILE, ADULT - NSICDXPASTMEDICALHX_GEN_ALL_CORE_FT
PAST MEDICAL HISTORY:  GERD (gastroesophageal reflux disease)     History of IBS     HLD (hyperlipidemia)     HTN (hypertension)     Hypothyroidism     Postmenopausal bleeding

## 2024-03-07 NOTE — CONSULT NOTE ADULT - TIME BILLING
- Review of records, telemetry, vital signs and daily labs.   - General and cardiovascular physical examination.  - Generation of cardiovascular treatment plan.  - Coordination of care.      Patient was seen and examined by me on 03/07/2024,interim events noted,labs and radiology studies reviewed.  Jed Mondragon MD,FACC.  [address] [place], [place] [id number].  [phone]

## 2024-03-07 NOTE — CONSULT NOTE ADULT - SUBJECTIVE AND OBJECTIVE BOX
SHIRLEY MIREILLE  78y  Female      Patient is a 78y old  Female who presents with a chief complaint of       PAST MEDICAL/SURGICAL HISTORY  PAST MEDICAL & SURGICAL HISTORY:  HTN (hypertension)      GERD (gastroesophageal reflux disease)      Hypothyroidism      HLD (hyperlipidemia)      History of IBS      Postmenopausal bleeding          REVIEW OF SYSTEMS:  CONSTITUTIONAL: No fever, weight loss, or fatigue  EYES: No eye pain, visual disturbances, or discharge  ENMT:  No difficulty hearing, tinnitus, vertigo; No sinus or throat pain  NECK: No pain or stiffness  BREASTS: No pain, masses, or nipple discharge  RESPIRATORY: No cough, wheezing, chills or hemoptysis; No shortness of breath  CARDIOVASCULAR: No chest pain, palpitations, dizziness, or leg swelling  GASTROINTESTINAL: No abdominal or epigastric pain. No nausea, vomiting, or hematemesis; No diarrhea or constipation. No melena or hematochezia.  GENITOURINARY: No dysuria, frequency, hematuria, or incontinence  NEUROLOGICAL: No headaches, memory loss, loss of strength, numbness, or tremors  SKIN: No itching, burning, rashes, or lesions   LYMPH NODES: No enlarged glands  ENDOCRINE: No heat or cold intolerance; No hair loss  MUSCULOSKELETAL: No joint pain or swelling; No muscle, back, or extremity pain  PSYCHIATRIC: No depression, anxiety, mood swings, or difficulty sleeping  HEME/LYMPH: No easy bruising, or bleeding gums  ALLERY AND IMMUNOLOGIC: No hives or eczema    T(C): 36.1 (03-07-24 @ 11:54), Max: 36.7 (03-07-24 @ 06:36)  HR: 61 (03-07-24 @ 12:54) (51 - 70)  BP: 109/57 (03-07-24 @ 12:54) (95/43 - 154/83)  RR: 12 (03-07-24 @ 12:54) (11 - 16)  SpO2: 99% (03-07-24 @ 12:54) (99% - 100%)  Wt(kg): --Vital Signs Last 24 Hrs  T(C): 36.1 (07 Mar 2024 11:54), Max: 36.7 (07 Mar 2024 06:36)  T(F): 97 (07 Mar 2024 11:54), Max: 98.1 (07 Mar 2024 06:36)  HR: 61 (07 Mar 2024 12:54) (51 - 70)  BP: 109/57 (07 Mar 2024 12:54) (95/43 - 154/83)  BP(mean): 72 (07 Mar 2024 12:54) (56 - 72)  RR: 12 (07 Mar 2024 12:54) (11 - 16)  SpO2: 99% (07 Mar 2024 12:54) (99% - 100%)    Parameters below as of 07 Mar 2024 12:54  Patient On (Oxygen Delivery Method): room air        PHYSICAL EXAM:  GENERAL: NAD, well-groomed, well-developed  HEAD:  Atraumatic, Normocephalic  EYES: EOMI, PERRLA, conjunctiva and sclera clear  ENMT: No tonsillar erythema, exudates, or enlargement; Moist mucous membranes, Good dentition, No lesions  NECK: Supple, No JVD, Normal thyroid  NERVOUS SYSTEM:  Alert & Oriented X3, Good concentration; Motor Strength 5/5 B/L upper and lower extremities; DTRs 2+ intact and symmetric  CHEST/LUNG: Clear to percussion bilaterally; No rales, rhonchi, wheezing, or rubs  HEART: Regular rate and rhythm; No murmurs, rubs, or gallops  ABDOMEN: Soft, Nontender, Nondistended; Bowel sounds present  EXTREMITIES:  2+ Peripheral Pulses, No clubbing, cyanosis, or edema  LYMPH: No lymphadenopathy noted  SKIN: No rashes or lesions    Consultant(s) Notes Reviewed:  [x ] YES  [ ] NO  Care Discussed with Consultants/Other Providers [ x] YES  [ ] NO    LABS:  CBC   03-07-24 @ 13:09  Hematcorit 39.6  Hemoglobin 13.0  Mean Cell Hemoglobin 31.8  Platelet Count-Automated 175  RBC Count 4.09  Red Cell Distrib Width 14.9  Wbc Count 16.02      BMP  03-07-24 @ 13:09  Anion Gap. Serum 4  Blood Urea Nitrogen,Serm 16  Calcium, Total Serum 8.5  Carbon Dioxide, Serum 27  Chloride, Serum 107  Creatinine, Serum 1.15  eGFR in  --  eGFR in Non Afican American --  Gloucose, serum 120  Potassium, Serum 4.0  Sodium, Serum 138                  CMP  03-07-24 @ 13:09  Aleja Aminotransferase(ALT/SGPT)--  Albumin, Serum --  Alkaline Phosphatase, Serum --  Anion Gap, Serum 4  Aspartate Aminotransferase (AST/SGOT)--  Bilirubin Total, Serum --  Blood Urea Nitrogen, Serum 16  Calcium,Total Serum 8.5  Carbon Dioxide, Serum 27  Chloride, Serum 107  Creatinine, Serum 1.15  eGFR if  --  eGFR if Non African American --  Glucose, Serum 120  Potassium, Serum 4.0  Protein Total, Serum --  Sodium, Serum 138                          PT/INR      Amylase/Lipase            RADIOLOGY & ADDITIONAL TESTS:    Imaging Personally Reviewed:  [ ] YES  [ ] NO Patient is a 79 y/o F who presents with a chief complaint of 78 y.o female with PMHx for GERD, IBS, HLD, HTN, Hypothyroidism. C/o of vaginal bleeding for one month.  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 Medicine consulted due to new TWI on EKG.        PAST MEDICAL/SURGICAL HISTORY  PAST MEDICAL & SURGICAL HISTORY:  HTN (hypertension)      GERD (gastroesophageal reflux disease)      Hypothyroidism      HLD (hyperlipidemia)      History of IBS      Postmenopausal bleeding          REVIEW OF SYSTEMS:  CONSTITUTIONAL: No fever, weight loss, or fatigue  EYES: No eye pain, visual disturbances, or discharge  ENMT:  No difficulty hearing, tinnitus, vertigo; No sinus or throat pain  NECK: No pain or stiffness  BREASTS: No pain, masses, or nipple discharge  RESPIRATORY: No cough, wheezing, chills or hemoptysis; No shortness of breath  CARDIOVASCULAR: No chest pain, palpitations, dizziness, or leg swelling  GASTROINTESTINAL: No abdominal or epigastric pain. No nausea, vomiting, or hematemesis; No diarrhea or constipation. No melena or hematochezia.  GENITOURINARY: No dysuria, frequency, hematuria, or incontinence  NEUROLOGICAL: No headaches, memory loss, loss of strength, numbness, or tremors  SKIN: No itching, burning, rashes, or lesions   LYMPH NODES: No enlarged glands  ENDOCRINE: No heat or cold intolerance; No hair loss  MUSCULOSKELETAL: No joint pain or swelling; No muscle, back, or extremity pain  PSYCHIATRIC: No depression, anxiety, mood swings, or difficulty sleeping  HEME/LYMPH: No easy bruising, or bleeding gums  ALLERY AND IMMUNOLOGIC: No hives or eczema    T(C): 36.1 (03-07-24 @ 11:54), Max: 36.7 (03-07-24 @ 06:36)  HR: 61 (03-07-24 @ 12:54) (51 - 70)  BP: 109/57 (03-07-24 @ 12:54) (95/43 - 154/83)  RR: 12 (03-07-24 @ 12:54) (11 - 16)  SpO2: 99% (03-07-24 @ 12:54) (99% - 100%)  Wt(kg): --Vital Signs Last 24 Hrs  T(C): 36.1 (07 Mar 2024 11:54), Max: 36.7 (07 Mar 2024 06:36)  T(F): 97 (07 Mar 2024 11:54), Max: 98.1 (07 Mar 2024 06:36)  HR: 61 (07 Mar 2024 12:54) (51 - 70)  BP: 109/57 (07 Mar 2024 12:54) (95/43 - 154/83)  BP(mean): 72 (07 Mar 2024 12:54) (56 - 72)  RR: 12 (07 Mar 2024 12:54) (11 - 16)  SpO2: 99% (07 Mar 2024 12:54) (99% - 100%)    Parameters below as of 07 Mar 2024 12:54  Patient On (Oxygen Delivery Method): room air        PHYSICAL EXAM:  GENERAL: NAD, well-groomed, well-developed  HEAD:  Atraumatic, Normocephalic  EYES: EOMI, PERRLA, conjunctiva and sclera clear  ENMT: No tonsillar erythema, exudates, or enlargement; Moist mucous membranes, Good dentition, No lesions  NECK: Supple, No JVD, Normal thyroid  NERVOUS SYSTEM:  Alert & Oriented X3, Good concentration; Motor Strength 5/5 B/L upper and lower extremities; DTRs 2+ intact and symmetric  CHEST/LUNG: Clear to percussion bilaterally; No rales, rhonchi, wheezing, or rubs  HEART: Regular rate and rhythm; No murmurs, rubs, or gallops  ABDOMEN: Soft, Nontender, Nondistended; Bowel sounds present  EXTREMITIES:  2+ Peripheral Pulses, No clubbing, cyanosis, or edema  LYMPH: No lymphadenopathy noted  SKIN: No rashes or lesions    Consultant(s) Notes Reviewed:  [x ] YES  [ ] NO  Care Discussed with Consultants/Other Providers [ x] YES  [ ] NO    LABS:  CBC   03-07-24 @ 13:09  Hematcorit 39.6  Hemoglobin 13.0  Mean Cell Hemoglobin 31.8  Platelet Count-Automated 175  RBC Count 4.09  Red Cell Distrib Width 14.9  Wbc Count 16.02      BMP  03-07-24 @ 13:09  Anion Gap. Serum 4  Blood Urea Nitrogen,Serm 16  Calcium, Total Serum 8.5  Carbon Dioxide, Serum 27  Chloride, Serum 107  Creatinine, Serum 1.15  eGFR in  --  eGFR in Non Afican American --  Gloucose, serum 120  Potassium, Serum 4.0  Sodium, Serum 138                  CMP  03-07-24 @ 13:09  Aleja Aminotransferase(ALT/SGPT)--  Albumin, Serum --  Alkaline Phosphatase, Serum --  Anion Gap, Serum 4  Aspartate Aminotransferase (AST/SGOT)--  Bilirubin Total, Serum --  Blood Urea Nitrogen, Serum 16  Calcium,Total Serum 8.5  Carbon Dioxide, Serum 27  Chloride, Serum 107  Creatinine, Serum 1.15  eGFR if  --  eGFR if Non African American --  Glucose, Serum 120  Potassium, Serum 4.0  Protein Total, Serum --  Sodium, Serum 138                          PT/INR      Amylase/Lipase            RADIOLOGY & ADDITIONAL TESTS:    Imaging Personally Reviewed:  [ ] YES  [ ] NO

## 2024-03-07 NOTE — PACU DISCHARGE NOTE - COMMENTS
Clear for discharge from pacu. No anesthetic complications  to 6n on telemetry  TWI in v4,v5,v6 spoke with Dr. Beavers, Go to 6N on telemetry. TO be followed by medicine team as well  Troponin is negative

## 2024-03-07 NOTE — CHART NOTE - NSCHARTNOTEFT_GEN_A_CORE
Dr. Julio medicine and Dr. Mondragon cardiology consulted for t-wave inversions in PACU Dr. Juilo medicine and Dr. Mondragon cardiology consulted for t-wave inversions in PACU  pt currently resting comfortably, no signs of chest pain, shortness of breath, or any other concerns  current vitals: 60 hr, 123/55 bp, 90 spo2 on room air, 10 rr  EKG shows signs of abnormal T waves possible anterolateral ischemic changes  awaiting labs at this time

## 2024-03-07 NOTE — CONSULT NOTE ADULT - ATTENDING COMMENTS
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 Medicine consulted for new TWI in EKG, found after patient brought to PACU.     Per patient she had recent cardiology visit and thinks it was a stress test that was done, however unable to recall the results. She had seen Dr Matt Angulo ([phone]), and pcp had a copy of her cardiac clearance note which states she was deemed acceptable risk for intmd risk procedure but not copies of prior ekg given. Today patient seen and examined at bedside, states she had no chest pain or dyspnea.      Labs, vitals and EKG reviewed. BP in 150s, HR 70s. Trop noted to be negative. Leukocytosis to 16, however may be reactive d/t surgery. EKG with TWI in EKG in V1-V6, and avL   On exam: elderly female, lying calmly in bed, NAD, NC/AT, RRR, lungs CTA, mild abd ttp around surgical site, no LE edema     #Abnl EKG   #New TWI   #Endometrial Hyperplasia   #Ovarian Mass   #s/p robotic TAHBSO   #HTN   #HLD   #Hypothyroidism   #Leukocytosis   -monitor on telemetry   -c/w home asa, statin   -pt reports recent stress test, tried to obtain from cardiologist, however office closed   -obtain TTE   -resume home medication, losartan, synthroid   -trend cbc, monitor off abx for now as no fevers noted   -nye mgmt per gyn

## 2024-03-07 NOTE — CONSULT NOTE ADULT - ASSESSMENT
Office Note  Chief Complaint   Patient presents with   • Diabetes     follow up, due for eye exam        HPI: Patient is a 87 year old female new to me here for a current evaluation of DM and HTN. Presents with daughter (who is a doctor)    DM  - Compliant with medication regimen  - Fasting blood sugar ranges from 130-180 mg/dL  - UTD on Ophthalmology, last eye exam was in 3/2019   - Denies side effects with medication or eye discharge  - Last HbA1c was 7.9% as of 10/5/2019  - Previously on Toujeo 300 MG daily  - If FBS is <130 mg/dL, pt takes half of insulin dose; if above, increases dose    HTN  - Compliant with medication regimen w/o side effects   - Denies dizziness, lightheadedness, SOB, CP, LE edema, HA, vision changes  - Denies fever, chills, acute issues    Hx CVA  - Compliant with blood thinner  - Denies excessive bruising  - No recent issues     Hx Lt Tibial Fracture  - Occurred 3/2019  - Resulted in ORIF up LLE  - 3 months at rehab center  - Physical therapy done after surgery  - Reports it was a \"clumsy\" mechanical fall  - Last visit with Ortho was~2 weeks ago   - Continues PT ~once a week  - Denies pain upon movement  - Ambulates with 4-pronged cane, doing well and getting strength back     Hx Recurrent UTI  - Believes it may be complication of DM  - Previously became disoriented  - Currently asymptomatic  - No urinary complaints/issues    C/o abnormal skin lesion  - Daughter (is a physician) believes possible squamous cell on Rt temporal region lateral to Rt eye    - Has apt next week with Dermatologist  - Spot has gotten bigger over past ~2 months, notes it is \"oozy, weepy, unattractive\"  - No biopsy performed yet  - Denies pain in Rt temporal region    SocialHx  - Lives alone  - Attends senior exercise class  - Two daughters and one son who help her   - One daughter is a doctor living in [place], helps with adjusting medication when she visits    Review of Systems  Constitutional: No fever, no  chills  Skin: No rashes, +lesion in Rt temporal region  HEENT: No blurred vision, no ear pain, no sore throat.   Respiratory: No shortness of breath.  Cardiovascular: No chest pain, no chest pressure.  Gastrointestinal: No nausea, no vomiting, no diarrhea, no abdominal pain, no black or tarry stools. No bloody BMs  Genitourinary: Normal voiding.  Extremities:  Negative except for HPI  Neurologic: . No dizziness, no lightheadedness, no syncope. No headaches  Endocrine: Negative except for HPI  Hematological: Negative except for HPI  Psychiatric: Negative except for HPI  10 point review of systems completed. All systems otherwise reviewed and negative except HPI      Current Outpatient Medications   Medication Sig Dispense Refill   • irbesartan (AVAPRO) 300 MG tablet Take 1 tablet by mouth daily. As directed 90 tablet 3   • potassium CHLORIDE (KLOR-CON M) 20 MEQ bhupinder ER tablet TAKE 1 TABLET BY MOUTH  DAILY 90 tablet 0   • metFORMIN (GLUCOPHAGE) 500 MG tablet TAKE 1 TABLET BY MOUTH  EVERY 12 HOURS 180 tablet 1   • amLODIPine (NORVASC) 10 MG tablet TAKE 1 TABLET BY MOUTH  EVERY DAY 90 tablet 0   • hydrochlorothiazide (MICROZIDE) 12.5 MG capsule TAKE 1 CAPSULE BY MOUTH  DAILY 90 capsule 0   • nitrofurantoin (MACRODANTIN) 50 MG capsule Take 1 capsule by mouth daily. 90 capsule 1   • Insulin Pen Needle (NOVOFINE) 32G X 6 MM Misc daily. Use to inject insulin one time daily. Remove needle cover(s) to expose needle before injecting. 90 each 3   • Lancets 28G Misc daily. 90 each 3   • blood glucose (FREESTYLE LITE) test strip Test blood sugar 1 times daily as directed. Diagnosis: E11.9 90 strip 3   • clopidogrel (PLAVIX) 75 MG tablet TAKE 1 TABLET BY MOUTH  DAILY 90 tablet 3   • benazepril (LOTENSIN) 20 MG tablet TAKE 1 TABLET BY MOUTH  DAILY 90 tablet 3   • metoPROLOL tartrate (LOPRESSOR) 100 MG tablet TAKE 1 TABLET BY MOUTH  EVERY 12 HOURS 180 tablet 3   • TOUJEO SOLOSTAR 300 UNIT/ML pen-injector INJECT SUBCUTANEOUSLY 30   UNITS DAILY 9 mL 3   • atorvastatin (LIPITOR) 20 MG tablet TAKE 1 TABLET BY MOUTH  DAILY AS DIRECTED 90 tablet 3   • aspirin 81 MG tablet        No current facility-administered medications for this visit.        LABS  Lab Results   Component Value Date    SODIUM 145 10/05/2019    POTASSIUM 4.1 10/05/2019    CHLORIDE 111 (H) 10/05/2019    CO2 27 10/05/2019    BUN 23 (H) 10/05/2019    CREATININE 0.74 10/05/2019    CALCIUM 9.5 10/05/2019    ALBUMIN 3.5 (L) 10/05/2019    BILIRUBIN 0.5 10/05/2019    ALKPT 78 10/05/2019    GPT 19 10/05/2019    AST 13 10/05/2019    GLUCOSE 72 10/05/2019     Lab Results   Component Value Date    CHOLESTEROL 141 10/05/2019    TRIGLYCERIDE 65 10/05/2019    HDL 57 10/05/2019    CALCLDL 71 10/05/2019     Lab Results   Component Value Date    WBC 5.8 10/05/2019    RBC 4.44 10/05/2019    HGB 12.5 10/05/2019    HCT 39.3 10/05/2019    MCV 88.5 10/05/2019    MCH 28.2 10/05/2019    MCHC 31.8 (L) 10/05/2019     10/05/2019    TLYMPH 30 10/05/2019    PMON 18 10/05/2019    PEOS 2 10/05/2019    PBASO 1 10/05/2019    ANEUT 2.9 10/05/2019    ALYMS 1.7 10/05/2019    GONZALO 1.1 (H) 10/05/2019    AEOS 0.1 10/05/2019    ABASO 0.0 10/05/2019    NEUT NOT APPLICABLE 07/22/2018    NEUT NOT APPLICABLE 07/22/2018    TDIF AUTOMATED DIFFERENTIAL 10/05/2019    IANC NOT APPLICABLE 07/22/2018    IANC NOT APPLICABLE 07/22/2018    NRBCRE 0 10/05/2019    PIMGR 1 10/05/2019    AIMGR 0.0 10/05/2019     Lab Results   Component Value Date    HGBA1C 7.9 (H) 10/05/2019     Lab Results   Component Value Date    TSH 5.522 (H) 10/05/2019     Lab Results   Component Value Date    URMIC 6.78 10/05/2019    MALBCR 62.8 (H) 10/05/2019       ALLERGIES:  No Known Allergies    Patient Active Problem List   Diagnosis   • CVA (cerebral vascular accident) (CMS/[facility])   • Essential hypertension   • Hyperlipidemia   • Type 2 diabetes mellitus with diabetic neuropathy, with long-term current use of insulin (CMS/[facility])   • UTI (urinary tract  infection)   • Wound of left leg   • Closed fracture of left proximal tibia        No past medical history on file.    Past Surgical History:   Procedure Laterality Date   • Tibia fracture surgery         Family History   Problem Relation Age of Onset   • Patient is unaware of any medical problems Mother    • Patient is unaware of any medical problems Father        Social History     Socioeconomic History   • Marital status:      Spouse name: Not on file   • Number of children: Not on file   • Years of education: Not on file   • Highest education level: Not on file   Occupational History   • Not on file   Social Needs   • Financial resource strain: Not on file   • Food insecurity:     Worry: Not on file     Inability: Not on file   • Transportation needs:     Medical: Not on file     Non-medical: Not on file   Tobacco Use   • Smoking status: Never Smoker   • Smokeless tobacco: Never Used   Substance and Sexual Activity   • Alcohol use: Never     Frequency: Never   • Drug use: Never   • Sexual activity: Not on file   Lifestyle   • Physical activity:     Days per week: Not on file     Minutes per session: Not on file   • Stress: Not on file   Relationships   • Social connections:     Talks on phone: Not on file     Gets together: Not on file     Attends [employer] service: Not on file     Active member of club or organization: Not on file     Attends meetings of clubs or organizations: Not on file     Relationship status: Not on file   • Intimate partner violence:     Fear of current or ex partner: Not on file     Emotionally abused: Not on file     Physically abused: Not on file     Forced sexual activity: Not on file   Other Topics Concern   • Not on file   Social History Narrative   • Not on file       Immunization History   Administered Date(s) Administered   • Influenza, high dose seasonal, preservative-free 09/30/2016, 09/17/2017   • Influenza, injectable, quadrivalent, preservative-free 09/01/2017,  11/06/2018   • Influenza, seasonal, injectable, preservative free 10/22/2014, 10/10/2015         Physical:    Visit Vitals  /64 (BP Location: LUE - Left upper extremity, Patient Position: Sitting, Cuff Size: Regular)   Pulse 54   Temp 96.6 °F (35.9 °C) (Tympanic)   Ht 5' 5\" (1.651 m)   Wt 69.8 kg (153 lb 14.1 oz)   SpO2 96%   BMI 25.61 kg/m²       Physical Exam  General: NAD, awake/alert, nontoxic appearance   Head: normocephalic, nontraumatic   ENT: External ears nl, PERRLA, EOMI, normal sclera b/l, oropharynx nl   Neck: supple, no swelling, no tenderness, no thyromegaly  Lungs: CTAB, no wheezing/crackles  CV: Regular rhythm, no murmur, no leg edema, +bradycardia  Abdomen: soft, nontender, nondistended  : Not examined 2/2 no acute complaints.   MSK: normal gait, normal strength, normal tone, +ambulates with cane, no calf TTP, no bony tenderness  Neuro: no focal deficits, AxOx3, CN II - XII intact.  Skin: warm,dry, +quarter-sized circular pearly papule with mild bleeding, no TTP, no pus  Psych: normal mood/behavior     Assessment and Plan  Problem List Items Addressed This Visit     None      Visit Diagnoses     Need for immunization against influenza    -  Primary    Relevant Orders    INFLUENZA QUADRIVALENT SPLIT 0.5 ML VACC MULTIDOSE, IM (FLUAVAL,FLUZONE) (Completed)    Elevated TSH              Reviewed medication list  Reviewed previous labs assessing with updated labs  Will reassess based on findings     DM2  - Controlled, a1c at goal for age   - CPM  - UTD on Ophthalmology exam    Skin Lesion  - Highly concerning for malignancy  - Pt has apt with Dermatology scheduled  - Given our card so we may get consultation note    Bradycardia  - Asymptomatic  - Pt will continue to monitor HR  - If lightheadedness/dizziness/irregular HR occurs, return to clinic immediately as may need change in beta blocker dosing.    HTN  - BP stable  - CPM    Subclinical Hypothyroidism  - Ordered repeat TSH to be done in 6  weeks    HM  - Administered Influenza Vaccine in office today.     Follow up in 6 months.    Call or return to clinic as needed if these symptoms worsen, fail to improve as anticipated, or if new symptoms develop.    Patient education completed on disease process, etiology & prognosis.    Patient expresses understanding of the plan.    Return to clinic as clinically indicated as discussed with patient who verbalized understanding of & agreement with the plan.     Scribe Attestation: On 10/14/2019, Tao SAMPSON scribed the services personally performed by Sheyla Smith MD.   Provider Attestation: The documentation recorded by the scribe accurately reflects the service I personally performed and the decisions made by me, Sheyla Smith MD.        Patient is a 77 y/o F who presents with a chief complaint of 78 y.o female with PMHx for GERD, IBS, HLD, HTN, Hypothyroidism. C/o of vaginal bleeding for one month.  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 Medicine consulted due to new TWI on EKG.    #CAD (r/o)  #Endometrial Hyperplasia  #Ovarian Mass  #HTN  #HLD  #Hypothyroidism    #CAD (r/o)  - s/p TAHBSO  - EKG - new TWI  - Trop - neg  - denies any chest pain, shortness of breath, palpitation  - Admit to Telemetry  - continue home med - ASA, atorvastatin  - f/u ECHO  - Cardio (Dr. Mondragon) consulted    #Endometrial Hyperplasia  #Ovarian Mass  - s/p robotic TAHBSO (3/8/2024)  - rest of plan per Primary Team (GYN)    #HTN  - home med - losartan  - BP monitoring  - continue home med    #HLD  - continue home med - atorvastatin    #Hypothyroidism  - continue home med - synthroid    #BPH  - continue home med - tamsulosin    Rest of the Plan per Primary Team (OB-GYN)  Medicine will follow while in-patient  Thank you very much for this consult Patient is a 79 y/o F who presents with a chief complaint of 78 y.o female with PMHx for GERD, IBS, HLD, HTN, Hypothyroidism. C/o of vaginal bleeding for one month.  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 Medicine consulted due to new TWI on EKG.    #CAD (r/o)  #Endometrial Hyperplasia  #Ovarian Mass  #HTN  #HLD  #Hypothyroidism  #Leukocytosis    #CAD  - Hx of CAD  - s/p TAHBSO  - EKG - sinus bradycardia, TWI leads aVL, V2-V6 (anterolateral leads)  - Trop - neg  - denies any chest pain, shortness of breath, palpitation  - Admit to Telemetry  - continue home med - ASA, atorvastatin  - recent stress test done outpatient (Dr. Angulo - [phone])  - outpatient EKG - NSR, NSSTTC (2/27/2024)  - Cardio (Dr. Mondragon) consulted  - Primary Team to try to obtain outpatient Cardiology records (attempted to call but no answer)    #Endometrial Hyperplasia  #Ovarian Mass  - s/p robotic TAHBSO (3/8/2024)  - rest of plan per Primary Team (GYN)    #HTN  - home med - losartan  - BP monitoring  - continue home med    #HLD  - continue home med - atorvastatin    #Hypothyroidism  - continue home med - synthroid    #BPH  - continue home med - tamsulosin    #Leukocytosis  - WBC - 16  - likely reactive post-op  - non-septic, afebrile  - hold ABx for now  - monitor CBC    Rest of the Plan per Primary Team (OB-GYN)  Medicine will follow while in-patient  Thank you very much for this consult

## 2024-03-07 NOTE — CONSULT NOTE ADULT - SUBJECTIVE AND OBJECTIVE BOX
MR:- [id number] :  NAME:MIREILLE BANKS:-    DATE OF SERVICE:03-07-24 @ 21:37    Patient was seen,examined and evaluated  by Jed Mondragon MD [id number] @ 21:37 .  ER evaluation, Labs and Hospital course was reviewed,    CHIEF COMPLAINT:    HPI:HPI:        CARDIAC HISTORY:  [ ] CAD [ [PCI [ ] CABG [ ] Prior Cath  [ ] Atrial Fibrillation  Devices[ ] PPM [ ] ICD [ ]ILR  Heart Failure [ ] HFrEF [ ] HFpEF    PAST MEDICAL & SURGICAL HISTORY:  HTN (hypertension)      GERD (gastroesophageal reflux disease)      Hypothyroidism      HLD (hyperlipidemia)      History of IBS      Postmenopausal bleeding          MEDICATIONS  (STANDING):  atorvastatin 20 milliGRAM(s) Oral at bedtime  lactated ringers. 1000 milliLiter(s) (100 mL/Hr) IV Continuous <Continuous>  levothyroxine 100 MICROGram(s) Oral daily  losartan 50 milliGRAM(s) Oral daily  pantoprazole    Tablet 40 milliGRAM(s) Oral before breakfast    MEDICATIONS  (PRN):  morphine  - Injectable 2 milliGRAM(s) IV Push every 4 hours PRN Severe Pain (7 - 10)  oxycodone    5 mG/acetaminophen 325 mG 1 Tablet(s) Oral every 4 hours PRN Moderate Pain (4 - 6)      FAMILY HISTORY:    No family history of premature coronary artery disease or sudden cardiac death    SOCIAL HISTORY:  Smoking-[ ] Active  [ ] Former [ ] Non Smoker  Alcohol-[ ] Denies [ ] Social [ ] Daily  Ilicit Drug use-[ ] Denies [ ] Active user    REVIEW OF SYSTEMS:  Constitutional: [ ] fever, [ ]weight loss, [ ]fatigue   Activity [ ] Bedbound,[ ] Ambulates [ ] Unassisted[ ] Cane/Walker [ ] Assistence.  Effort tolerance:[ ] Excellent [ ] Good [ ] Fair [ ] Poor [ ]  Eyes: [ ] visual changes  Respiratory: [ ]shortness of breath;  [ ] cough, [ ]wheezing, [ ]chills, [ ]hemoptysis  Cardiovascular: [ ] chest pain, [ ]palpitations, [ ]dizziness,  [ ]leg swelling[ ]orthopnea [ ]PND  Gastrointestinal: [ ] abdominal pain, [ ]nausea, [ ]vomiting,  [ ]diarrhea,[ ]constipation  Genitourinary: [ ] dysuria, [ ] hematuria  Neurologic: [ ] headaches [ ] tremors[ ] weakness  Skin: [ ] itching, [ ]burning, [ ] rashes  Endocrine: [ ] heat or cold intolerance  Musculoskeletal: [ ] joint pain or swelling; [ ] muscle, back, or extremity pain  Psychiatric: [ ] depression, [ ]anxiety, [ ]mood swings, or [ ]difficulty sleeping  Hematologic: [ ] easy bruising, [ ] bleeding gums       [ x] All others negative	  [ ] Unable to obtain    Vital Signs Last 24 Hrs  T(C): 36.8 (07 Mar 2024 20:24), Max: 36.8 (07 Mar 2024 20:24)  T(F): 98.3 (07 Mar 2024 20:24), Max: 98.3 (07 Mar 2024 20:24)  HR: 71 (07 Mar 2024 20:24) (51 - 74)  BP: 152/68 (07 Mar 2024 20:24) (95/43 - 154/83)  BP(mean): 96 (07 Mar 2024 20:24) (56 - 96)  RR: 18 (07 Mar 2024 20:24) (11 - 18)  SpO2: 98% (07 Mar 2024 20:24) (97% - 100%)    Parameters below as of 07 Mar 2024 20:24  Patient On (Oxygen Delivery Method): room air      I&O's Summary    07 Mar 2024 07:01  -  07 Mar 2024 21:37  --------------------------------------------------------  IN: 1300 mL / OUT: 1035 mL / NET: 265 mL        PHYSICAL EXAM:  General: No acute distress BMI-  HEENT: EOMI, PERRL[ ] Icteric  Neck: Supple, [ ] JVD  Lungs: Equal air entry bilaterally; [ ] Rales [ ] Rhonchi [ ] Wheezing  Heart: Regular rate and rhythm;[ ] Murmurs-   /6 [ ] Systolic [ ] Diastolic [ ] Radiation,No rubs, or gallops  Abdomen: Nontender, bowel sounds present  Extremities: No clubbing, cyanosis, [ ] edema[ ] Calf tenderness  Nervous system:  Alert & Oriented X3, no focal deficits  Psychiatric: Normal affect  Skin: No rashes or lesions      LABS:  03-07    138  |  107  |  16  ----------------------------<  120<H>  4.0   |  27  |  1.15    Ca    8.5      07 Mar 2024 13:09      Creatinine Trend: 1.15<--                        13.0   16.02 )-----------( 175      ( 07 Mar 2024 13:09 )             39.6         Lipid Panel:   Cardiac Enzymes:           RADIOLOGY:    ECG [my interpretation]:    TELEMETRY:    ECHO:    STRESS TEST:    CATHETERIZATION: MR:- [id number] :  NAME:-MIREILLE WATSON:-    DATE OF SERVICE:03-07-24 @ 21:37    Patient was seen,examined and evaluated  by Jed Mondragon MD [id number] @ 21:37 .  ER evaluation, Labs and Hospital course was reviewed,    CHIEF COMPLAINT:Abn ECG    HPI:HPI:   79 y/o F who presents with a chief complaint of 78 y.o female with PMHx for GERD, IBS, HLD, HTN, Hypothyroidism. C/o of vaginal bleeding for one month.  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 Medicine consulted due to new TWI on EKG.        CARDIAC HISTORY:  [ ] CAD [ [PCI [ ] CABG [ ] Prior Cath  [ ] Atrial Fibrillation  Devices[ ] PPM [ ] ICD [ ]ILR  Heart Failure [ ] HFrEF [ ] HFpEF    PAST MEDICAL & SURGICAL HISTORY:  HTN (hypertension)      GERD (gastroesophageal reflux disease)      Hypothyroidism      HLD (hyperlipidemia)      History of IBS      Postmenopausal bleeding          MEDICATIONS  (STANDING):  atorvastatin 20 milliGRAM(s) Oral at bedtime  lactated ringers. 1000 milliLiter(s) (100 mL/Hr) IV Continuous <Continuous>  levothyroxine 100 MICROGram(s) Oral daily  losartan 50 milliGRAM(s) Oral daily  pantoprazole    Tablet 40 milliGRAM(s) Oral before breakfast    MEDICATIONS  (PRN):  morphine  - Injectable 2 milliGRAM(s) IV Push every 4 hours PRN Severe Pain (7 - 10)  oxycodone    5 mG/acetaminophen 325 mG 1 Tablet(s) Oral every 4 hours PRN Moderate Pain (4 - 6)      FAMILY HISTORY:    No family history of premature coronary artery disease or sudden cardiac death    SOCIAL HISTORY:  Smoking-[ ] Active  [ ] Former [ ] Non Smoker  Alcohol-[ ] Denies [ ] Social [ ] Daily  Ilicit Drug use-[ ] Denies [ ] Active user    REVIEW OF SYSTEMS:  Constitutional: [ ] fever, [ ]weight loss, [ ]fatigue   Activity [ ] Bedbound,[ ] Ambulates [ ] Unassisted[ ] Cane/Walker [ ] Assistence.  Effort tolerance:[ ] Excellent [ ] Good [ ] Fair [ ] Poor [ ]  Eyes: [ ] visual changes  Respiratory: [ ]shortness of breath;  [ ] cough, [ ]wheezing, [ ]chills, [ ]hemoptysis  Cardiovascular: [ ] chest pain, [ ]palpitations, [ ]dizziness,  [ ]leg swelling[ ]orthopnea [ ]PND  Gastrointestinal: [ ] abdominal pain, [ ]nausea, [ ]vomiting,  [ ]diarrhea,[ ]constipation  Genitourinary: [ ] dysuria, [ ] hematuria  Neurologic: [ ] headaches [ ] tremors[ ] weakness  Skin: [ ] itching, [ ]burning, [ ] rashes  Endocrine: [ ] heat or cold intolerance  Musculoskeletal: [ ] joint pain or swelling; [ ] muscle, back, or extremity pain  Psychiatric: [ ] depression, [ ]anxiety, [ ]mood swings, or [ ]difficulty sleeping  Hematologic: [ ] easy bruising, [ ] bleeding gums       [ x] All others negative	  [ ] Unable to obtain    Vital Signs Last 24 Hrs  T(C): 36.8 (07 Mar 2024 20:24), Max: 36.8 (07 Mar 2024 20:24)  T(F): 98.3 (07 Mar 2024 20:24), Max: 98.3 (07 Mar 2024 20:24)  HR: 71 (07 Mar 2024 20:24) (51 - 74)  BP: 152/68 (07 Mar 2024 20:24) (95/43 - 154/83)  BP(mean): 96 (07 Mar 2024 20:24) (56 - 96)  RR: 18 (07 Mar 2024 20:24) (11 - 18)  SpO2: 98% (07 Mar 2024 20:24) (97% - 100%)    Parameters below as of 07 Mar 2024 20:24  Patient On (Oxygen Delivery Method): room air      I&O's Summary    07 Mar 2024 07:01  -  07 Mar 2024 21:37  --------------------------------------------------------  IN: 1300 mL / OUT: 1035 mL / NET: 265 mL        PHYSICAL EXAM:  General: No acute distress BMI-  HEENT: EOMI, PERRL[ ] Icteric  Neck: Supple, [ ] JVD  Lungs: Equal air entry bilaterally; [ ] Rales [ ] Rhonchi [ ] Wheezing  Heart: Regular rate and rhythm;[ ] Murmurs-   /6 [ ] Systolic [ ] Diastolic [ ] Radiation,No rubs, or gallops  Abdomen: Nontender, bowel sounds present  Extremities: No clubbing, cyanosis, [ ] edema[ ] Calf tenderness  Nervous system:  Alert & Oriented X3, no focal deficits  Psychiatric: Normal affect  Skin: No rashes or lesions      LABS:  03-07    138  |  107  |  16  ----------------------------<  120<H>  4.0   |  27  |  1.15    Ca    8.5      07 Mar 2024 13:09      Creatinine Trend: 1.15<--                        13.0   16.02 )-----------( 175      ( 07 Mar 2024 13:09 )             39.6

## 2024-03-07 NOTE — BRIEF OPERATIVE NOTE - SPECIMENS
1)  Left sentinel lymph node  2)  Right sentinel lymph node  3)  Uterus, cervix, bilateral fallopian tubes and ovaries

## 2024-03-08 ENCOUNTER — TRANSCRIPTION ENCOUNTER (OUTPATIENT)
Age: 79
End: 2024-03-08

## 2024-03-08 ENCOUNTER — RESULT REVIEW (OUTPATIENT)
Age: 79
End: 2024-03-08

## 2024-03-08 VITALS
RESPIRATION RATE: 18 BRPM | HEART RATE: 79 BPM | TEMPERATURE: 99 F | OXYGEN SATURATION: 95 % | SYSTOLIC BLOOD PRESSURE: 155 MMHG | DIASTOLIC BLOOD PRESSURE: 77 MMHG

## 2024-03-08 DIAGNOSIS — R94.31 ABNORMAL ELECTROCARDIOGRAM [ECG] [EKG]: ICD-10-CM

## 2024-03-08 LAB
ANION GAP SERPL CALC-SCNC: 5 MMOL/L — SIGNIFICANT CHANGE UP (ref 5–17)
BASOPHILS # BLD AUTO: 0.03 K/UL — SIGNIFICANT CHANGE UP (ref 0–0.2)
BASOPHILS NFR BLD AUTO: 0.3 % — SIGNIFICANT CHANGE UP (ref 0–2)
BUN SERPL-MCNC: 11 MG/DL — SIGNIFICANT CHANGE UP (ref 7–18)
CALCIUM SERPL-MCNC: 8.1 MG/DL — LOW (ref 8.4–10.5)
CHLORIDE SERPL-SCNC: 106 MMOL/L — SIGNIFICANT CHANGE UP (ref 96–108)
CO2 SERPL-SCNC: 27 MMOL/L — SIGNIFICANT CHANGE UP (ref 22–31)
CREAT SERPL-MCNC: 1.04 MG/DL — SIGNIFICANT CHANGE UP (ref 0.5–1.3)
EGFR: 54 ML/MIN/1.73M2 — LOW
EOSINOPHIL # BLD AUTO: 0.04 K/UL — SIGNIFICANT CHANGE UP (ref 0–0.5)
EOSINOPHIL NFR BLD AUTO: 0.3 % — SIGNIFICANT CHANGE UP (ref 0–6)
GLUCOSE SERPL-MCNC: 147 MG/DL — HIGH (ref 70–99)
HCT VFR BLD CALC: 36.2 % — SIGNIFICANT CHANGE UP (ref 34.5–45)
HGB BLD-MCNC: 12.1 G/DL — SIGNIFICANT CHANGE UP (ref 11.5–15.5)
IMM GRANULOCYTES NFR BLD AUTO: 0.4 % — SIGNIFICANT CHANGE UP (ref 0–0.9)
LYMPHOCYTES # BLD AUTO: 1.11 K/UL — SIGNIFICANT CHANGE UP (ref 1–3.3)
LYMPHOCYTES # BLD AUTO: 9.4 % — LOW (ref 13–44)
MCHC RBC-ENTMCNC: 31.8 PG — SIGNIFICANT CHANGE UP (ref 27–34)
MCHC RBC-ENTMCNC: 33.4 GM/DL — SIGNIFICANT CHANGE UP (ref 32–36)
MCV RBC AUTO: 95.3 FL — SIGNIFICANT CHANGE UP (ref 80–100)
MONOCYTES # BLD AUTO: 0.71 K/UL — SIGNIFICANT CHANGE UP (ref 0–0.9)
MONOCYTES NFR BLD AUTO: 6 % — SIGNIFICANT CHANGE UP (ref 2–14)
NEUTROPHILS # BLD AUTO: 9.86 K/UL — HIGH (ref 1.8–7.4)
NEUTROPHILS NFR BLD AUTO: 83.6 % — HIGH (ref 43–77)
NRBC # BLD: 0 /100 WBCS — SIGNIFICANT CHANGE UP (ref 0–0)
PLATELET # BLD AUTO: 167 K/UL — SIGNIFICANT CHANGE UP (ref 150–400)
POTASSIUM SERPL-MCNC: 3.6 MMOL/L — SIGNIFICANT CHANGE UP (ref 3.5–5.3)
POTASSIUM SERPL-SCNC: 3.6 MMOL/L — SIGNIFICANT CHANGE UP (ref 3.5–5.3)
RBC # BLD: 3.8 M/UL — SIGNIFICANT CHANGE UP (ref 3.8–5.2)
RBC # FLD: 15 % — HIGH (ref 10.3–14.5)
SODIUM SERPL-SCNC: 138 MMOL/L — SIGNIFICANT CHANGE UP (ref 135–145)
WBC # BLD: 11.8 K/UL — HIGH (ref 3.8–10.5)
WBC # FLD AUTO: 11.8 K/UL — HIGH (ref 3.8–10.5)

## 2024-03-08 PROCEDURE — 85025 COMPLETE CBC W/AUTO DIFF WBC: CPT

## 2024-03-08 PROCEDURE — 80048 BASIC METABOLIC PNL TOTAL CA: CPT

## 2024-03-08 PROCEDURE — 88342 IMHCHEM/IMCYTCHM 1ST ANTB: CPT

## 2024-03-08 PROCEDURE — 88305 TISSUE EXAM BY PATHOLOGIST: CPT

## 2024-03-08 PROCEDURE — S2900: CPT

## 2024-03-08 PROCEDURE — 86901 BLOOD TYPING SEROLOGIC RH(D): CPT

## 2024-03-08 PROCEDURE — 86923 COMPATIBILITY TEST ELECTRIC: CPT

## 2024-03-08 PROCEDURE — 93010 ELECTROCARDIOGRAM REPORT: CPT

## 2024-03-08 PROCEDURE — 36415 COLL VENOUS BLD VENIPUNCTURE: CPT

## 2024-03-08 PROCEDURE — C1889: CPT

## 2024-03-08 PROCEDURE — 88307 TISSUE EXAM BY PATHOLOGIST: CPT

## 2024-03-08 PROCEDURE — T1013: CPT

## 2024-03-08 PROCEDURE — 93005 ELECTROCARDIOGRAM TRACING: CPT

## 2024-03-08 PROCEDURE — 88112 CYTOPATH CELL ENHANCE TECH: CPT

## 2024-03-08 PROCEDURE — 93306 TTE W/DOPPLER COMPLETE: CPT

## 2024-03-08 PROCEDURE — 99232 SBSQ HOSP IP/OBS MODERATE 35: CPT | Mod: GC

## 2024-03-08 PROCEDURE — 86850 RBC ANTIBODY SCREEN: CPT

## 2024-03-08 PROCEDURE — 84484 ASSAY OF TROPONIN QUANT: CPT

## 2024-03-08 PROCEDURE — 85027 COMPLETE CBC AUTOMATED: CPT

## 2024-03-08 PROCEDURE — 86900 BLOOD TYPING SEROLOGIC ABO: CPT

## 2024-03-08 RX ORDER — IBUPROFEN 200 MG
600 TABLET ORAL EVERY 6 HOURS
Refills: 0 | Status: DISCONTINUED | OUTPATIENT
Start: 2024-03-08 | End: 2024-03-08

## 2024-03-08 RX ORDER — HEPARIN SODIUM 5000 [USP'U]/ML
5000 INJECTION INTRAVENOUS; SUBCUTANEOUS EVERY 12 HOURS
Refills: 0 | Status: DISCONTINUED | OUTPATIENT
Start: 2024-03-08 | End: 2024-03-08

## 2024-03-08 RX ORDER — LEVOTHYROXINE SODIUM 125 MCG
1 TABLET ORAL
Refills: 0 | DISCHARGE

## 2024-03-08 RX ORDER — ACETAMINOPHEN 500 MG
650 TABLET ORAL EVERY 6 HOURS
Refills: 0 | Status: DISCONTINUED | OUTPATIENT
Start: 2024-03-08 | End: 2024-03-08

## 2024-03-08 RX ORDER — LOSARTAN POTASSIUM 100 MG/1
1 TABLET, FILM COATED ORAL
Refills: 0 | DISCHARGE

## 2024-03-08 RX ORDER — IBUPROFEN 200 MG
1 TABLET ORAL
Qty: 60 | Refills: 0
Start: 2024-03-08

## 2024-03-08 RX ORDER — SENNA PLUS 8.6 MG/1
1 TABLET ORAL
Qty: 60 | Refills: 0
Start: 2024-03-08

## 2024-03-08 RX ORDER — BACLOFEN 100 %
1 POWDER (GRAM) MISCELLANEOUS
Refills: 0 | DISCHARGE

## 2024-03-08 RX ORDER — LINACLOTIDE 145 UG/1
1 CAPSULE, GELATIN COATED ORAL
Refills: 0 | DISCHARGE

## 2024-03-08 RX ORDER — SODIUM CHLORIDE 9 MG/ML
1000 INJECTION, SOLUTION INTRAVENOUS
Refills: 0 | Status: DISCONTINUED | OUTPATIENT
Start: 2024-03-08 | End: 2024-03-08

## 2024-03-08 RX ORDER — MIRABEGRON 50 MG/1
1 TABLET, EXTENDED RELEASE ORAL
Refills: 0 | DISCHARGE

## 2024-03-08 RX ORDER — ACETAMINOPHEN 500 MG
2 TABLET ORAL
Qty: 60 | Refills: 0
Start: 2024-03-08

## 2024-03-08 RX ORDER — PANTOPRAZOLE SODIUM 20 MG/1
1 TABLET, DELAYED RELEASE ORAL
Refills: 0 | DISCHARGE

## 2024-03-08 RX ORDER — ATORVASTATIN CALCIUM 80 MG/1
1 TABLET, FILM COATED ORAL
Refills: 0 | DISCHARGE

## 2024-03-08 RX ADMIN — MORPHINE SULFATE 2 MILLIGRAM(S): 50 CAPSULE, EXTENDED RELEASE ORAL at 06:25

## 2024-03-08 RX ADMIN — Medication 600 MILLIGRAM(S): at 10:30

## 2024-03-08 RX ADMIN — PANTOPRAZOLE SODIUM 40 MILLIGRAM(S): 20 TABLET, DELAYED RELEASE ORAL at 06:26

## 2024-03-08 RX ADMIN — LOSARTAN POTASSIUM 50 MILLIGRAM(S): 100 TABLET, FILM COATED ORAL at 05:23

## 2024-03-08 RX ADMIN — MORPHINE SULFATE 2 MILLIGRAM(S): 50 CAPSULE, EXTENDED RELEASE ORAL at 05:23

## 2024-03-08 RX ADMIN — Medication 100 MICROGRAM(S): at 05:23

## 2024-03-08 RX ADMIN — Medication 600 MILLIGRAM(S): at 09:44

## 2024-03-08 NOTE — PROGRESS NOTE ADULT - PROBLEM SELECTOR PLAN 1
Neuro: APAP, Motrin PRN, no narcotics   Cardio: continue telemonitoring , cardiology consulted, awaiting echo to be performed, repeat stat EKG , continue home lipitor, losartan   Pulm: encourage IS, saturating well on RA   GI: advanced to reg diet, Zofran PRN, awaiting flatus/BM  Renal/: IVf LR @ 50cc ( to dc once tolerating regular) , continue to monitor I/O  ID: no leukocytosis, no abx, afebrile   Endo: continue sarwat synthroid   Heme: SCDs, Heparin q12 DVT ppx   Ortho: OOB and WBAT
- advance diet to clear diet  - parris d/c in AM  - cbc/bmp in AM  - medicine consult for TWI, recommendations appreciated  - cardio consulted  - pain management pr pt request  - cont current management  - cont home meds  - plan for d/c tomorrow  - d/w Dr. Torres

## 2024-03-08 NOTE — DISCHARGE NOTE PROVIDER - NSDCCPCAREPLAN_GEN_ALL_CORE_FT
PRINCIPAL DISCHARGE DIAGNOSIS  Diagnosis: S/P total hysterectomy  Assessment and Plan of Treatment:       SECONDARY DISCHARGE DIAGNOSES  Diagnosis: S/P total hysterectomy  Assessment and Plan of Treatment:

## 2024-03-08 NOTE — DISCHARGE NOTE PROVIDER - HOSPITAL COURSE
77 y/o PMHx  HTN, hypothyroidism s/p RA TLH- BSO, SLND, cysto and TAP block with Dr. Torres. Patient tolerated procedure well and transferred to PACU. Hospital course complicated by abnormal EKG; cardiology consulted.   Patient is now voiding without difficulty, tolerating diet, passing flatus and ambulating. Discharge home with outpatient follow-up 77 y/o PMHx  HTN, hypothyroidism s/p RA TLH- BSO, SLND, cysto and TAP block with Dr. Torres. Patient tolerated procedure well and transferred to PACU. Hospital course complicated by abnormal EKG; cardiology consulted and work up completed.    Patient is now voiding without difficulty, tolerating diet, passing flatus and ambulating. Discharge home with outpatient follow-up

## 2024-03-08 NOTE — PROGRESS NOTE ADULT - ATTENDING COMMENTS
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 Medicine consulted for new TWI in EKG, found after patient brought to PACU.     Pt seen and examined this morning, denies any chest pain, sob, dizziness, nausea.   Labs, vitals reviewed. BP in 120-150s, HR 70s.  Leukocytosis down to 11.   On exam: elderly female, lying calmly in bed, NAD, NC/AT, RRR, lungs CTA, mild abd ttp around surgical site, no LE edema     #Abnl EKG   #New TWI   #Endometrial Hyperplasia   #Ovarian Mass   #s/p robotic TAHBSO   #HTN   #HLD   #Hypothyroidism   #Leukocytosis     -monitor on telemetry   -c/w home asa, statin   -pt reports recent stress test, tried to obtain from cardiologist, however office closed, repeat TTE ordered  -d/w cardiology, no further inpatient workup necessary  -c/w home medications, losartan, synthroid   -trend cbc, monitor off abx for now as no fevers noted   -dvt ppx, ambulate as tolerated, incentive spirometer 77 yo F with pmh of HTN, HLD, hypothyroidism, GERD, IBS, who had abnl vaginal bleed, 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 Medicine consulted for new TWI in EKG, found after patient brought to PACU.      #[id number]  Pt seen and examined this morning, denies any chest pain, sob, dizziness, nausea.   Labs, vitals reviewed. BP in 120-150s, HR 70s.  Leukocytosis down to 11.   On exam: elderly female, lying calmly in bed, NAD, NC/AT, RRR, lungs CTA, mild abd ttp around surgical site, no LE edema     #Abnl EKG   #New TWI   #Endometrial Hyperplasia   #Ovarian Mass   #s/p robotic TAHBSO   #HTN   #HLD   #Hypothyroidism   #Leukocytosis     -monitor on telemetry   -c/w home asa, statin   -pt reports recent stress test, tried to obtain from cardiologist, however office closed, repeat TTE ordered  -d/w cardiology, no further inpatient workup necessary  -c/w home medications, losartan, synthroid   -trend cbc, monitor off abx for now as no fevers noted   -dvt ppx, ambulate as tolerated, incentive spirometer

## 2024-03-08 NOTE — PROGRESS NOTE ADULT - SUBJECTIVE AND OBJECTIVE BOX
GYN PA NOTE  POD# 1  Patient seen resting comfortably seen at bedside with Dr. Caicedo. Patient currently has no current complaints.  + Ambulation, + void without difficulty ( 200c) , + flatus and tolerating clear diet.     Denies HA, CP, SOB, dizziness, palpitations, worsening abdominal pain, worsening vaginal bleeding or any other concerns.    Vital Signs Last 24 Hrs  T(C): 36.7 (08 Mar 2024 05:30), Max: 36.9 (08 Mar 2024 00:03)  T(F): 98.1 (08 Mar 2024 05:30), Max: 98.4 (08 Mar 2024 00:03)  HR: 70 (08 Mar 2024 05:30) (51 - 74)  BP: 143/71 (08 Mar 2024 05:30) (95/43 - 153/79)  BP(mean): 95 (08 Mar 2024 05:30) (56 - 96)  RR: 18 (08 Mar 2024 05:30) (11 - 18)  SpO2: 95% (08 Mar 2024 05:30) (95% - 100%)    Parameters below as of 08 Mar 2024 05:30  Patient On (Oxygen Delivery Method): room air      CAPILLARY BLOOD GLUCOSE          Gen: A&O x3, NAD  Chest: CTABL  Cardiac: S1+S2+ RRR  Abdomen: Soft, mild tenderness to RLQ,  nondistended, dressings clean/dry/intact  Gyn: No active bleeding  Extremities: Nontender, no worsening edema,  venodynes intact bilaterally    CBC Full  -  ( 08 Mar 2024 04:51 )  WBC Count : 11.80 K/uL  RBC Count : 3.80 M/uL  Hemoglobin : 12.1 g/dL  Hematocrit : 36.2 %  Platelet Count - Automated : 167 K/uL  Mean Cell Volume : 95.3 fl  Mean Cell Hemoglobin : 31.8 pg  Mean Cell Hemoglobin Concentration : 33.4 gm/dL  Auto Neutrophil # : 9.86 K/uL  Auto Lymphocyte # : 1.11 K/uL  Auto Monocyte # : 0.71 K/uL  Auto Eosinophil # : 0.04 K/uL  Auto Basophil # : 0.03 K/uL  Auto Neutrophil % : 83.6 %  Auto Lymphocyte % : 9.4 %  Auto Monocyte % : 6.0 %  Auto Eosinophil % : 0.3 %  Auto Basophil % : 0.3 %    03-08    138  |  106  |  11  ----------------------------<  147<H>  3.6   |  27  |  1.04    Ca    8.1<L>      08 Mar 2024 04:51      03-08    138  |  106  |  11  ----------------------------<  147<H>  3.6   |  27  |  1.04    Ca    8.1<L>      08 Mar 2024 04:51      CAPILLARY BLOOD GLUCOSE

## 2024-03-08 NOTE — PROGRESS NOTE ADULT - ASSESSMENT
Patient is a 79 y/o F who presents with a chief complaint of 78 y.o female with PMHx for GERD, IBS, HLD, HTN, Hypothyroidism. C/o of vaginal bleeding for one month.  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 Medicine consulted due to new TWI on EKG.    #CAD (r/o)  #Endometrial Hyperplasia  #Ovarian Mass  #HTN  #HLD  #Hypothyroidism  #Leukocytosis    #CAD  - Hx of CAD  - s/p TAHBSO  - EKG - sinus bradycardia, TWI leads aVL, V2-V6 (anterolateral leads)  - Trop - neg  - denies any chest pain, shortness of breath, palpitation  - Admit to Telemetry  - continue home med - ASA, atorvastatin  - recent stress test done outpatient (Dr. Angulo - [phone])  - outpatient EKG - NSR, NSSTTC (2/27/2024)  - Cardio (Dr. Mondragon) consulted  - Primary Team to try to obtain outpatient Cardiology records (attempted to call but no answer)    #Endometrial Hyperplasia  #Ovarian Mass  - s/p robotic TAHBSO (3/8/2024)  - rest of plan per Primary Team (GYN)    #HTN  - home med - losartan  - BP monitoring  - continue home med    #HLD  - continue home med - atorvastatin    #Hypothyroidism  - continue home med - synthroid    #BPH  - continue home med - tamsulosin    #Leukocytosis  - WBC - 16  - likely reactive post-op  - non-septic, afebrile  - hold ABx for now  - monitor CBC    Rest of the Plan per Primary Team (OB-GYN)  Medicine will follow while in-patient  Thank you very much for this consult Patient is a 79 y/o F who presents with a chief complaint of 78 y.o female with PMHx for GERD, IBS, HLD, HTN, Hypothyroidism. C/o of vaginal bleeding for one month.  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 Medicine consulted due to new TWI on EKG.    #CAD (r/o)  #Endometrial Hyperplasia  #Ovarian Mass  #HTN  #HLD  #Hypothyroidism  #Leukocytosis    #CAD  - Hx of CAD  - s/p TAHBSO  - EKG - sinus bradycardia, TWI leads aVL, V2-V6 (anterolateral leads)  - Trop - neg  - denies any chest pain, shortness of breath, palpitation  - Admit to Telemetry  - continue home med - ASA, atorvastatin  - recent stress test done outpatient (Dr. Angulo - [phone])  - outpatient EKG - NSR, NSSTTC (2/27/2024)  - Cardio (Dr. Mondragon) consulted  - no further in-patient cardiac work-up    #Endometrial Hyperplasia  #Ovarian Mass  - s/p robotic TAHBSO (3/8/2024)  - rest of plan per Primary Team (GYN)    #HTN  - home med - losartan  - BP monitoring  - continue home med    #HLD  - continue home med - atorvastatin    #Hypothyroidism  - continue home med - synthroid    #BPH  - continue home med - tamsulosin    #Leukocytosis  - WBC - 16  - likely reactive post-op  - non-septic, afebrile  - hold ABx for now  - monitor CBC    Discharge plan as per Primary Team (OB-GYN)  Medicine will follow while in-patient  Thank you very much for this consult

## 2024-03-08 NOTE — PROGRESS NOTE ADULT - ASSESSMENT
Do not bite fingernails. Cut nails straight across.   Do not poke at the area.  Warm soaks for 10 to 15 minutes several times a day. Keep clean and dry.   Monitor for increasing signs and symptoms of infection such as increased redness, pain, swelling, fever, or red streaks. Follow up with PCP immediately or go to the ER if these develop.   Follow up in 3 days if no improvement.   Apply Bacitracin ointment three times a day for 7 days.     Podiatry and Foot Surgery:    Dr. Gagan Loya  [facility] [phone]    Dr. Aida Lassiter [phone]    Dr. Xavier Egan  [place] [phone]    Dr. Fredy Jacome all ages; podiatry sports medicine also  [place] (Monday, Thursday, Saturday AM)  [place] (Monday, Tuesday, Friday)  [phone]    Paronychia (Child)  Paronychia is an infection alongside the fingernail or toenail. The infection causes a red, swollen, painful area around the edge of the nail. It can include the border of the finger or toe. Or it can extend away from the nail. The infection may include a pocket of fluid (pus).  Paronychia can occur when the skin is damaged around the nail, the cuticle, or the nail fold. This lets bacteria get under the skin. Common causes include:  · Ingrown toenail  · Injury, such as a splinter  · Sucking, chewing, picking, or biting the nails  · Cutting the nails too close  · Pulling out a hang nail  The area may be treated with wound care and topical or oral antibiotics. If there is pus, the area may need to be drained.  Home care  Your child’s healthcare provider may prescribe an oral antibiotic to treat the infection. Follow all instructions for using it. Don’t stop giving your child this medicine until it is gone. Antibiotics must be taken as a full course. Give your child pain medicine as directed by the healthcare provider. Don’t give your child aspirin or any over-the-counter medicine unless told to by the healthcare  provider. Your healthcare provider may prescribe other creams, including topic steroid creams.  General care  · Twice a day for the first 3 days, help your child clean and soak the toe or finger. Soak the area in warm (not hot) water for 5 minutes. Clean away any crust with soap and water using a cotton-tipped swab.  · Change the dressing daily, or when it becomes wet or soiled.  · If the infection is on your child’s toe, avoid putting shoes on that foot until the toe has healed. Or, make sure your child wears open-toed shoes or comfortable shoes with plenty of room.  Follow-up care  Follow up with your child’s healthcare provider or as advised.  When to seek medical advice  Call your child’s healthcare provider right away if any of these occur:  · Fever of 100.4°F (38°C) or higher, or as directed by your child's healthcare provider  · A child 2 years or older has a fever for more than 3 days  · A child of any age has repeated fevers above 104°F (40°C)  · Redness or swelling that gets worse  · Fussiness or crying that can’t be soothed  · Pain that gets worse  · Red streaks in the skin leading away from the wound  · Warmth, redness, or swelling  · Foul-smelling fluid leaking from the skin   General Care:   · Twice a day for the first three days, help your child clean and soak the toe or finger as follows:  ¨ Soak the foot or hand in a tub of warm (not hot) water for five minutes.  ¨ Clean awau any remaining crust with soap and water using a cotton-tipped swab.  · Change the dressing daily or whenever it becomes soiled.  · If the infection is on your child’s toe, dress your child in comfortable shoes with plenty of toe room, or have him or her wear open-toe sandals while the toe is healing.    *-*-*-*-*-*-*-  [facility]    Monday - Thursday 8 a.m. - 8 p.m.  Friday                        8 a.m. - 8 p.m.    Saturday (and holidays) 8 a.m. - 4 p.m.  Sunday                                      Closed  *-*-*-*-*-*-*-  [facility] We Have New Hours on Fridays, we are now open til 8pm    [email or web address]  See current wait times for [facility] in real-time!  Reserve your waiting-room spot in line!   Receive text/email messages if our wait times are long,  so that you can do your waiting at home or work, instead of in our waiting room.     Note: This system does not guarantee an \"appointment time\" to see a provider. Also, patients may be called out of the waiting room \"ahead of turn\" in emergency situations, at discretion of Urgent Care staff.  ----------------  Thank you for choosing [facility] today. We hope you had a pleasant experience and we look forward to serving your future needs.    · If you have any questions about your VISIT, please call [phone].    · If you have any questions about your BILL, please call [phone].    · If you need a copy of your MEDICAL RECORD, please call [phone].    If you receive a survey in the mail about today's services, we hope that you will take a few minutes to let us know about your experience.  --------------------------------------------------------------------------------------------------------------  UNLESS OTHERWISE INSTRUCTED BY YOUR URGENT CARE PROVIDER TODAY, all follow-up for your medical issues should be managed by your primary care provider. The Urgent Care does not manage chronic medical issues or refill medications. You are responsible for scheduling and keeping any necessary follow-up visits with your primary care provider after this visit today.   --------------------------------------------------------------------------------------------------------------  IF YOU WERE PRESCRIBED AN ANTIBIOTIC TODAY: We recommend taking an over-the-counter probiotic (Such as Florajen 3 for adults, or [id number] for children -- AVAILABLE IN THE [facility] and other local pharmacies too) once a day  for the entire duration of your antibiotics, and continuing it for 2 weeks after the antibiotics are finished. This will help reduce your chance of developing antibiotic-related diarrhea and/or yeast infections.  --------------------------------------------------------------------------------------------------------------          77 y/o F who presents with a chief complaint of 78 y.o female with PMHx for GERD, IBS, HLD, HTN, Hypothyroidism. C/o of vaginal bleeding for one month.  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 Medicine consulted due to new TWI on EKG.    #CAD (r/o)  #Endometrial Hyperplasia  #Ovarian Mass  #HTN  #HLD  #Hypothyroidism  #Leukocytosis    #CAD  - Hx of CAD  - s/p TAHBSO  - EKG - sinus bradycardia, TWI leads aVL, V2-V6 (anterolateral leads)  - Trop - neg  - denies any chest pain, shortness of breath, palpitation  - Admit to Telemetry  - continue home med - ASA, atorvastatin  - recent stress test done outpatient (Dr. Angulo - [phone])  - outpatient EKG - NSR, NSSTTC (2/27/2024)  -- no further in-patient cardiac work-up    #Endometrial Hyperplasia  #Ovarian Mass  - s/p robotic TAHBSO (3/8/2024)  - rest of plan per Primary Team (GYN)    #HTN  - home med - losartan  - BP monitoring  - continue home med    #HLD  - continue home med - atorvastatin    #Hypothyroidism  - continue home med - synthroid    #BPH  - continue home med - tamsulosin    #Leukocytosis  - WBC - 16  - likely reactive post-op  - non-septic, afebrile  - hold ABx for now  - monitor CBC

## 2024-03-08 NOTE — PROGRESS NOTE ADULT - TIME BILLING
- Review of records, telemetry, vital signs and daily labs.   - General and cardiovascular physical examination.  - Generation of cardiovascular treatment plan.  - Coordination of care.      Patient was seen and examined by me on 03/08/2024,interim events noted,labs and radiology studies reviewed.  Jed Mondragon MD,FACC.  [address] [place], [place] [id number].  [phone]

## 2024-03-08 NOTE — PROGRESS NOTE ADULT - ASSESSMENT
A/P: POD# 1 RA TLH, BSO with Dr. Torres. Patient tolerated procedure well and transferred to the floor for further management after EKG revealed T-wave inversion. Cardiology consulted and recommended Echo. Patient has passed her TOV.    Neuro: APAP, Motrin PRN, no narcotics   Cardio: continue telemonitoring , cardiology consulted, awaiting echo to be performed, repeat stat EKG , continue home lipitor, losartan   Pulm: encourage IS, saturating well on RA   GI: advanced to reg diet, Zofran PRN, awaiting flatus/BM  Renal/: IVf LR @ 50cc ( to dc once tolerating regular) , continue to monitor I/O  ID: no leukocytosis, no abx, afebrile   Endo: continue sarwat synthroid   Heme: SCDs, Heparin q12 DVT ppx   Ortho: OOB and WBAT       -dw dr. Caicedo

## 2024-03-08 NOTE — PROGRESS NOTE ADULT - SUBJECTIVE AND OBJECTIVE BOX
INCOMPLETE - FULL NOTE TO FOLLOW    PGY-3 Progress Note discussed with attending    PAGER #: [[phone]] TILL 5:00 PM  PLEASE CONTACT ON CALL TEAM:  - On Call Team (Please refer to Leon) FROM 5:00 PM - 8:30PM  - Nightfloat Team FROM 8:30 -7:30 AM    CHIEF COMPLAINT & BRIEF HOSPITAL COURSE:      INTERVAL HPI/OVERNIGHT EVENTS:       REVIEW OF SYSTEMS:  CONSTITUTIONAL: No fever, weight loss, or fatigue  RESPIRATORY: No cough, wheezing, chills or hemoptysis; No shortness of breath  CARDIOVASCULAR: No chest pain, palpitations, dizziness, or leg swelling  GASTROINTESTINAL: No abdominal pain. No nausea, vomiting, or hematemesis; No diarrhea or constipation. No melena or hematochezia.  GENITOURINARY: No dysuria or hematuria, urinary frequency  NEUROLOGICAL: No headaches, memory loss, loss of strength, numbness, or tremors  SKIN: No itching, burning, rashes, or lesions     MEDICATIONS  (STANDING):  atorvastatin 20 milliGRAM(s) Oral at bedtime  levothyroxine 100 MICROGram(s) Oral daily  losartan 50 milliGRAM(s) Oral daily  pantoprazole    Tablet 40 milliGRAM(s) Oral before breakfast    MEDICATIONS  (PRN):  morphine  - Injectable 2 milliGRAM(s) IV Push every 4 hours PRN Severe Pain (7 - 10)  oxycodone    5 mG/acetaminophen 325 mG 1 Tablet(s) Oral every 4 hours PRN Moderate Pain (4 - 6)      Vital Signs Last 24 Hrs  T(C): 36.7 (08 Mar 2024 05:30), Max: 36.9 (08 Mar 2024 00:03)  T(F): 98.1 (08 Mar 2024 05:30), Max: 98.4 (08 Mar 2024 00:03)  HR: 70 (08 Mar 2024 05:30) (51 - 74)  BP: 143/71 (08 Mar 2024 05:30) (95/43 - 153/79)  BP(mean): 95 (08 Mar 2024 05:30) (56 - 96)  RR: 18 (08 Mar 2024 05:30) (11 - 18)  SpO2: 95% (08 Mar 2024 05:30) (95% - 100%)    Parameters below as of 08 Mar 2024 05:30  Patient On (Oxygen Delivery Method): room air        PHYSICAL EXAMINATION:  GENERAL: NAD, well built  HEAD:  Atraumatic, Normocephalic  EYES:  conjunctiva and sclera clear  NECK: Supple, No JVD, Normal thyroid  CHEST/LUNG: Clear to auscultation. Clear to percussion bilaterally; No rales, rhonchi, wheezing, or rubs  HEART: Regular rate and rhythm; No murmurs, rubs, or gallops  ABDOMEN: Soft, Nontender, Nondistended; Bowel sounds present, no pain or masses on palpation  NERVOUS SYSTEM:  Alert & Oriented X3  : voiding well  EXTREMITIES:  2+ Peripheral Pulses, No clubbing, cyanosis, or edema  SKIN: warm dry                          12.1   11.80 )-----------( 167      ( 08 Mar 2024 04:51 )             36.2     03-08    138  |  106  |  11  ----------------------------<  147<H>  3.6   |  27  |  1.04    Ca    8.1<L>      08 Mar 2024 04:51                I&O's Summary    07 Mar 2024 07:01  -  08 Mar 2024 07:00  --------------------------------------------------------  IN: 1300 mL / OUT: 1835 mL / NET: -535 mL            CAPILLARY BLOOD GLUCOSE      RADIOLOGY & ADDITIONAL TESTS:                   INCOMPLETE - FULL NOTE TO FOLLOW    PGY-3 Progress Note discussed with attending    PAGER #: [[phone]] TILL 5:00 PM  PLEASE CONTACT ON CALL TEAM:  - On Call Team (Please refer to Leon) FROM 5:00 PM - 8:30PM  - Nightfloat Team FROM 8:30 -7:30 AM    CHIEF COMPLAINT & BRIEF HOSPITAL COURSE:    Patient is a 79 y/o F who presents with a chief complaint of 78 y.o female with PMHx for GERD, IBS, HLD, HTN, Hypothyroidism. C/o of vaginal bleeding for one month.  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 Medicine consulted due to new TWI on EKG.    INTERVAL HPI/OVERNIGHT EVENTS:     Patient was examined at bedside, AAOx4, stable, NAD. No other acute events overnight. No further in-patient Cardio work-up.    REVIEW OF SYSTEMS:  CONSTITUTIONAL: No fever, weight loss, or fatigue  RESPIRATORY: No cough, wheezing, chills or hemoptysis; No shortness of breath  CARDIOVASCULAR: No chest pain, palpitations, dizziness, or leg swelling  GASTROINTESTINAL: No abdominal pain. No nausea, vomiting, or hematemesis; No diarrhea or constipation. No melena or hematochezia.  GENITOURINARY: No dysuria or hematuria, urinary frequency  NEUROLOGICAL: No headaches, memory loss, loss of strength, numbness, or tremors  SKIN: No itching, burning, rashes, or lesions     MEDICATIONS  (STANDING):  atorvastatin 20 milliGRAM(s) Oral at bedtime  levothyroxine 100 MICROGram(s) Oral daily  losartan 50 milliGRAM(s) Oral daily  pantoprazole    Tablet 40 milliGRAM(s) Oral before breakfast    MEDICATIONS  (PRN):  morphine  - Injectable 2 milliGRAM(s) IV Push every 4 hours PRN Severe Pain (7 - 10)  oxycodone    5 mG/acetaminophen 325 mG 1 Tablet(s) Oral every 4 hours PRN Moderate Pain (4 - 6)      Vital Signs Last 24 Hrs  T(C): 36.7 (08 Mar 2024 05:30), Max: 36.9 (08 Mar 2024 00:03)  T(F): 98.1 (08 Mar 2024 05:30), Max: 98.4 (08 Mar 2024 00:03)  HR: 70 (08 Mar 2024 05:30) (51 - 74)  BP: 143/71 (08 Mar 2024 05:30) (95/43 - 153/79)  BP(mean): 95 (08 Mar 2024 05:30) (56 - 96)  RR: 18 (08 Mar 2024 05:30) (11 - 18)  SpO2: 95% (08 Mar 2024 05:30) (95% - 100%)    Parameters below as of 08 Mar 2024 05:30  Patient On (Oxygen Delivery Method): room air        PHYSICAL EXAMINATION:  GENERAL: NAD, well built  HEAD:  Atraumatic, Normocephalic  EYES:  conjunctiva and sclera clear  NECK: Supple, No JVD, Normal thyroid  CHEST/LUNG: Clear to auscultation. Clear to percussion bilaterally; No rales, rhonchi, wheezing, or rubs  HEART: Regular rate and rhythm; No murmurs, rubs, or gallops  ABDOMEN: Soft, Nontender, Nondistended; Bowel sounds present, no pain or masses on palpation  NERVOUS SYSTEM:  Alert & Oriented X3  : voiding well  EXTREMITIES:  2+ Peripheral Pulses, No clubbing, cyanosis, or edema  SKIN: warm dry                          12.1   11.80 )-----------( 167      ( 08 Mar 2024 04:51 )             36.2     03-08    138  |  106  |  11  ----------------------------<  147<H>  3.6   |  27  |  1.04    Ca    8.1<L>      08 Mar 2024 04:51                I&O's Summary    07 Mar 2024 07:01  -  08 Mar 2024 07:00  --------------------------------------------------------  IN: 1300 mL / OUT: 1835 mL / NET: -535 mL            CAPILLARY BLOOD GLUCOSE      RADIOLOGY & ADDITIONAL TESTS:

## 2024-03-08 NOTE — PROGRESS NOTE ADULT - PROBLEM SELECTOR PLAN 2
Neuro: APAP, Motrin PRN, no narcotics   Cardio: continue telemonitoring , cardiology consulted, awaiting echo to be performed, repeat stat EKG , continue home lipitor, losartan   Pulm: encourage IS, saturating well on RA   GI: advanced to reg diet, Zofran PRN, awaiting flatus/BM  Renal/: IVf LR @ 50cc ( to dc once tolerating regular) , continue to monitor I/O  ID: no leukocytosis, no abx, afebrile   Endo: continue sarwat synthroid   Heme: SCDs, Heparin q12 DVT ppx   Ortho: OOB and WBAT

## 2024-03-08 NOTE — DISCHARGE NOTE PROVIDER - NSDCMRMEDTOKEN_GEN_ALL_CORE_FT
aspirin 81 mg oral capsule: 1 cap(s) orally  atorvastatin 20 mg oral tablet: 1 tab(s) orally  baclofen 20 mg oral granule: 1 each orally  Kerendia 10 mg oral tablet: 2 tab(s) orally  Linzess 145 mcg oral capsule: 1 cap(s) orally  losartan 50 mg oral tablet: 1 tab(s) orally  Myrbetriq 25 mg oral tablet, extended release: 1 tab(s) orally  pantoprazole 40 mg oral delayed release tablet: 1 tab(s) orally  Synthroid 100 mcg (0.1 mg) oral tablet: 1 tab(s) orally   acetaminophen 325 mg oral capsule: 2 cap(s) orally every 6 hours as needed for pain  aspirin 81 mg oral capsule: 1 cap(s) orally  ibuprofen 600 mg oral tablet: 1 tab(s) orally every 6 hours as needed for pain  Kerendia 10 mg oral tablet: 2 tab(s) orally  senna (sennosides) 25 mg oral tablet: 1 tab(s) orally 1 to 2 times a day as needed for  constipation

## 2024-03-08 NOTE — DISCHARGE NOTE PROVIDER - NSDCCPTREATMENT_GEN_ALL_CORE_FT
PRINCIPAL PROCEDURE  Procedure: Bilateral salpingoophorectomy  Findings and Treatment:       SECONDARY PROCEDURE  Procedure: Total abdominal hysterectomy  Findings and Treatment:

## 2024-03-08 NOTE — DISCHARGE NOTE NURSING/CASE MANAGEMENT/SOCIAL WORK - PATIENT PORTAL LINK FT
You can access the FollowMyHealth Patient Portal offered by [facility] by registering at the following website: [email or web address]. By joining [facility]’s FollowMyHealth portal, you will also be able to view your health information using other applications (apps) compatible with our system.

## 2024-03-08 NOTE — PROGRESS NOTE ADULT - SUBJECTIVE AND OBJECTIVE BOX
PATIENT SEEN AND EXAMINED BY GREG MONTOYA M.D. ON :-03/08/2024  DATE OF SERVICE:       03/08/2024      Interim events noted,Labs ,Radiological studies and Cardiology tests reviewed .  DISCUSSED WITH ACP/MEDICAL RESIDENT ON PLAN OF CARE.      CHIEF COMPLAINT & BRIEF HOSPITAL COURSE:    Patient is a 77 y/o F who presents with a chief complaint of 78 y.o female with PMHx for GERD, IBS, HLD, HTN, Hypothyroidism. C/o of vaginal bleeding for one month.  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 Medicine consulted due to new TWI on EKG.    INTERVAL HPI/OVERNIGHT EVENTS: No chest Pain ambulating Repeat ECG no TWI noted    Patient was examined at bedside, AAOx4, stable, NAD. No other acute events overnight. No further in-patient Cardio work-up.    REVIEW OF SYSTEMS:  CONSTITUTIONAL: No fever, weight loss, or fatigue  RESPIRATORY: No cough, wheezing, chills or hemoptysis; No shortness of breath  CARDIOVASCULAR: No chest pain, palpitations, dizziness, or leg swelling  GASTROINTESTINAL: No abdominal pain. No nausea, vomiting, or hematemesis; No diarrhea or constipation. No melena or hematochezia.  GENITOURINARY: No dysuria or hematuria, urinary frequency  NEUROLOGICAL: No headaches, memory loss, loss of strength, numbness, or tremors  SKIN: No itching, burning, rashes, or lesions     MEDICATIONS  (STANDING):  atorvastatin 20 milliGRAM(s) Oral at bedtime  levothyroxine 100 MICROGram(s) Oral daily  losartan 50 milliGRAM(s) Oral daily  pantoprazole    Tablet 40 milliGRAM(s) Oral before breakfast    MEDICATIONS  (PRN):  morphine  - Injectable 2 milliGRAM(s) IV Push every 4 hours PRN Severe Pain (7 - 10)  oxycodone    5 mG/acetaminophen 325 mG 1 Tablet(s) Oral every 4 hours PRN Moderate Pain (4 - 6)      Vital Signs Last 24 Hrs  T(C): 36.7 (08 Mar 2024 05:30), Max: 36.9 (08 Mar 2024 00:03)  T(F): 98.1 (08 Mar 2024 05:30), Max: 98.4 (08 Mar 2024 00:03)  HR: 70 (08 Mar 2024 05:30) (51 - 74)  BP: 143/71 (08 Mar 2024 05:30) (95/43 - 153/79)  BP(mean): 95 (08 Mar 2024 05:30) (56 - 96)  RR: 18 (08 Mar 2024 05:30) (11 - 18)  SpO2: 95% (08 Mar 2024 05:30) (95% - 100%)    Parameters below as of 08 Mar 2024 05:30  Patient On (Oxygen Delivery Method): room air        PHYSICAL EXAMINATION:  GENERAL: NAD, well built  HEAD:  Atraumatic, Normocephalic  EYES:  conjunctiva and sclera clear  NECK: Supple, No JVD, Normal thyroid  CHEST/LUNG: Clear to auscultation. Clear to percussion bilaterally; No rales, rhonchi, wheezing, or rubs  HEART: Regular rate and rhythm; No murmurs, rubs, or gallops  ABDOMEN: Soft, Nontender, Nondistended; Bowel sounds present, no pain or masses on palpation  NERVOUS SYSTEM:  Alert & Oriented X3  : voiding well  EXTREMITIES:  2+ Peripheral Pulses, No clubbing, cyanosis, or edema  SKIN: warm dry                          12.1   11.80 )-----------( 167      ( 08 Mar 2024 04:51 )             36.2     03-08    138  |  106  |  11  ----------------------------<  147<H>  3.6   |  27  |  1.04    Ca    8.1<L>      08 Mar 2024 04:51            TTE W or WO Ultrasound Enhancing Agent (03.08.24 @ 09:53) >  CONCLUSIONS:      1. Left ventricular systolic function is normal with an ejection fraction of 60 % by Chopra's method of disks.   2. There is normal LV mass and concentric remodeling.   3. There is mild (grade 1) left ventricular diastolic dysfunction.   4. Normal right ventricular cavity size, with wall thickness, and normal systolic function.

## 2024-03-08 NOTE — DISCHARGE NOTE PROVIDER - NSDCFUADDINST_GEN_ALL_CORE_FT
Take Tylenol and/or Motrin every 6 hours as needed for pain.   Do NOT take more than 4,000mg of Tylenol daily.   Ambulate daily.    No heavy lifting or anything per vagina x 2 weeks - no sex, tampons, douching, tub baths, etc.   Follow up in office in 1-2 weeks for post op check.   Please call your doctor's office to schedule a follow up appointment

## 2024-03-08 NOTE — DISCHARGE NOTE PROVIDER - CARE PROVIDER_API CALL
Dawna Begum  Gynecologic Oncology  [address] [place] [id number]  Phone: [phone]  Fax: [phone]  Follow Up Time: 2 weeks

## 2024-03-08 NOTE — DISCHARGE NOTE PROVIDER - NSDCFUSCHEDAPPT_GEN_ALL_CORE_FT
Dawna Begum  [facility]  GYNONC [address]  Scheduled Appointment: 03/29/2024    [facility]  UROLOGY [address]  Scheduled Appointment: 05/06/2024    Jackie Alegre  [facility]  UROLOGY [address]  Scheduled Appointment: 05/06/2024

## 2024-03-12 ENCOUNTER — NON-APPOINTMENT (OUTPATIENT)
Age: 79
End: 2024-03-12

## 2024-03-12 LAB — NON-GYNECOLOGICAL CYTOLOGY STUDY: SIGNIFICANT CHANGE UP

## 2024-03-15 LAB — SURGICAL PATHOLOGY STUDY: SIGNIFICANT CHANGE UP

## 2024-03-27 ENCOUNTER — NON-APPOINTMENT (OUTPATIENT)
Age: 79
End: 2024-03-27

## 2024-03-27 PROBLEM — Z48.89 POSTOPERATIVE VISIT: Status: ACTIVE | Noted: 2024-03-27

## 2024-03-29 ENCOUNTER — APPOINTMENT (OUTPATIENT)
Dept: GYNECOLOGIC ONCOLOGY | Facility: CLINIC | Age: 79
End: 2024-03-29
Payer: MEDICARE

## 2024-03-29 VITALS
BODY MASS INDEX: 32.98 KG/M2 | HEART RATE: 97 BPM | WEIGHT: 168 LBS | SYSTOLIC BLOOD PRESSURE: 127 MMHG | DIASTOLIC BLOOD PRESSURE: 72 MMHG | OXYGEN SATURATION: 99 % | HEIGHT: 60 IN

## 2024-03-29 DIAGNOSIS — Z48.89 ENCOUNTER FOR OTHER SPECIFIED SURGICAL AFTERCARE: ICD-10-CM

## 2024-03-29 DIAGNOSIS — N89.8 OTHER SPECIFIED NONINFLAMMATORY DISORDERS OF VAGINA: ICD-10-CM

## 2024-03-29 PROBLEM — K21.9 GASTRO-ESOPHAGEAL REFLUX DISEASE WITHOUT ESOPHAGITIS: Chronic | Status: ACTIVE | Noted: 2024-02-26

## 2024-03-29 PROBLEM — E78.5 HYPERLIPIDEMIA, UNSPECIFIED: Chronic | Status: ACTIVE | Noted: 2024-02-26

## 2024-03-29 PROBLEM — E03.9 HYPOTHYROIDISM, UNSPECIFIED: Chronic | Status: ACTIVE | Noted: 2024-02-26

## 2024-03-29 PROBLEM — N95.0 POSTMENOPAUSAL BLEEDING: Chronic | Status: ACTIVE | Noted: 2024-02-26

## 2024-03-29 PROBLEM — Z87.19 PERSONAL HISTORY OF OTHER DISEASES OF THE DIGESTIVE SYSTEM: Chronic | Status: ACTIVE | Noted: 2024-02-26

## 2024-03-29 PROCEDURE — 99024 POSTOP FOLLOW-UP VISIT: CPT

## 2024-03-30 NOTE — ASSESSMENT
[FreeTextEntry1] : 78-year-old with stage IA endometrial cancer status post surgical staging here for postoperative visit.  - Doing well, meeting all postoperative milestones. - Postoperative recovery and expectations reviewed again in detail. - Final pathology report reviewed in detail. - Discussed that surveillance would include follow up every 3 months for 2 years and then every 6 months for an additional 2 years and then yearly thereafter. Education also provided on s/sxs of recurrence and when to seek evaluation with me sooner than the routine surveillance visits. -Patient notes some vaginal discharge that predates her surgery.  BD affirm sent to follow-up results and treat as indicated. - I spent the time noted on the day of this patient encounter preparing for, providing and documenting the above service. I have counseled and educated the patient on the differential, workup, disease course, and treatment/management plan. Education was provided to the patient during this encounter. All questions and concerns were answered and addressed in detail.

## 2024-03-30 NOTE — DISCUSSION/SUMMARY
[Clean] : was clean [Intact] : was intact [Dry] : was dry [None] : had no drainage [Normal Skin] : normal appearance [Normal Skin Turgor] : normal skin turgor [Doing Well] : is doing well [Excellent Pain Control] : has excellent pain control [No Sign of Infection] : is showing no signs of infection [Erythema] : was not erythematous [Ecchymosis] : was not ecchymotic [Firm] : soft [Tender] : nontender [Rebound] : no rebound tenderness [Abnormal Bowel Sounds] : normal bowel sounds [Guarding] : no guarding [Vaginal Exam Abnormal] : normal vaginal exam

## 2024-03-30 NOTE — REASON FOR VISIT
[Post Op] : post op visit [de-identified] : 3/7/2024 [de-identified] : Examination under anesthesia, robotic-assisted total laparoscopic hysterectomy, bilateral salpingo-oophorectomy, bilateral sentinel lymph node mapping and dissection, bilateral transversus abdominis  plane block, cystoscopy.   [de-identified] :  Denies f/c/n/v/d/vaginal bleeding.  Overall feels well.  Meeting milestones of recovery.  Regular BM and voiding freely. We reviewed her intraoperative findings her postoperative course as well as her final pathology results.  We discussed that given her final pathology demonstrated low risk disease and the plan will be for surveillance.  She has ongoing follow-up with her cardiologist.  Final path: 1.  Left sentinel pelvic lymph node; excision: -   Two lymph nodes, negative for metastatic carcinoma on H T E stains, 0/2. -   Cytokeratin AE1/3 to rule out isolated tumor cells is in progress and results will follow in an addendum.  2.  Right sentinel pelvic lymph node; excision: -   One lymph node, negative for metastatic carcinoma on H T E stains, 0/1. -   Cytokeratin AE1/3 to rule out isolated tumor cells is in progress and results will follow in an addendum.  3.  Uterus, cervix, bilateral fallopian tubes and ovaries; robotically assisted laparoscopic total hysterectomy, bilateral salpingo oophorectomy: -   Endometrial carcinoma, endometrioid type, FIGO grade 1 focally involving foci of adenomyosis,   pT1a. (see synoptic report). -   Myometrial and lymphovascular invasion is not identified. -   Uterine serosa is uninvolved. -   Cervical stromal invasion is not identified. -    Parametrial margins are negative. -   Bilateral ovaries with stromal hyperplasia. -   Cervix uteri and bilateral fallopian tubes, negative for malignancy. -   Right fallopian tube with simple paratubal cyst.  Addendum Microsatellite Instability (MSI) Analysis by PCR - Microsatellite Stable (BECKY) Comment:  No defective mismatch repair is detected.

## 2024-03-30 NOTE — LETTER BODY
[Dear  ___] : Dear  [unfilled], [I recently saw our patient [unfilled] for a follow-up visit.] : I recently saw our patient, [unfilled] for a follow-up visit. [Attached please find my note.] : Attached please find my note. [FreeTextEntry2] :   pt is s/p Examination under anesthesia, robotic-assisted total laparoscopic hysterectomy, bilateral salpingo-oophorectomy, bilateral sentinel lymph node mapping and dissection, bilateral transversus abdominis  plane block, cystoscopy on 3/7/2024. [FreeTextEntry1] : final path

## 2024-04-02 ENCOUNTER — APPOINTMENT (OUTPATIENT)
Dept: GYNECOLOGIC ONCOLOGY | Facility: CLINIC | Age: 79
End: 2024-04-02
Payer: MEDICARE

## 2024-04-02 LAB
CANDIDA VAG CYTO: NOT DETECTED
G VAGINALIS+PREV SP MTYP VAG QL MICRO: NOT DETECTED
T VAGINALIS VAG QL WET PREP: NOT DETECTED

## 2024-04-02 PROCEDURE — 99024 POSTOP FOLLOW-UP VISIT: CPT

## 2024-04-02 NOTE — LETTER BODY
[Dear  ___] : Dear  [unfilled], [Attached please find my note.] : Attached please find my note. [I recently saw our patient [unfilled] for a follow-up visit.] : I recently saw our patient, [unfilled] for a follow-up visit. [FreeTextEntry1] : final path [FreeTextEntry2] :   pt is s/p Examination under anesthesia, robotic-assisted total laparoscopic hysterectomy, bilateral salpingo-oophorectomy, bilateral sentinel lymph node mapping and dissection, bilateral transversus abdominis  plane block, cystoscopy on 3/7/2024.

## 2024-04-02 NOTE — ASSESSMENT
[FreeTextEntry1] : 78-year-old with stage IA endometrial cancer status post surgical staging here for postoperative visit.  - Doing well, meeting all postoperative milestones. - Today she presents for telemedicine visit as she would like to review her BD affirm results given her prior complaint of vaginal discharge. We reviewed her results in detail revealing no evidence of any infection.  Of note the patient also reports that her vaginal discharge is improved.  When questioning more into this vaginal discharge she notes that it all predated her surgery and we reviewed that that it may have been due to her underlying endometrial cancer.  She does note today that the discharge is improved, and she is without symptoms.  She is overall doing very well and very grateful for her care so far. - Discussed that surveillance would include follow up every 3 months for 2 years and then every 6 months for an additional 2 years and then yearly thereafter. Education also provided on s/sxs of recurrence and when to seek evaluation with me sooner than the routine surveillance visits. -Patient notes some vaginal discharge that predates her surgery.  BD affirm sent to follow-up results and treat as indicated. - For close interval follow up in 3 months or sooner as clinically indicated.  - I spent the time noted on the day of this patient encounter preparing for, providing and documenting the above service. I have counseled and educated the patient on the differential, workup, disease course, and treatment/management plan. Education was provided to the patient during this encounter. All questions and concerns were answered and addressed in detail.

## 2024-04-02 NOTE — REASON FOR VISIT
[Post Op] : post op visit [de-identified] : 3/7/2024 [de-identified] : Examination under anesthesia, robotic-assisted total laparoscopic hysterectomy, bilateral salpingo-oophorectomy, bilateral sentinel lymph node mapping and dissection, bilateral transversus abdominis  plane block, cystoscopy.   [de-identified] :  Denies f/c/n/v/d/vaginal bleeding.  Overall feels well.  Meeting milestones of recovery.  Regular BM and voiding freely. We reviewed her intraoperative findings her postoperative course as well as her final pathology results.  We discussed that given her final pathology demonstrated low risk disease and the plan will be for surveillance.  She has ongoing follow-up with her cardiologist.  Final path: 1.  Left sentinel pelvic lymph node; excision: -   Two lymph nodes, negative for metastatic carcinoma on H T E stains, 0/2. -   Cytokeratin AE1/3 to rule out isolated tumor cells is in progress and results will follow in an addendum.  2.  Right sentinel pelvic lymph node; excision: -   One lymph node, negative for metastatic carcinoma on H T E stains, 0/1. -   Cytokeratin AE1/3 to rule out isolated tumor cells is in progress and results will follow in an addendum.  3.  Uterus, cervix, bilateral fallopian tubes and ovaries; robotically assisted laparoscopic total hysterectomy, bilateral salpingo oophorectomy: -   Endometrial carcinoma, endometrioid type, FIGO grade 1 focally involving foci of adenomyosis,   pT1a. (see synoptic report). -   Myometrial and lymphovascular invasion is not identified. -   Uterine serosa is uninvolved. -   Cervical stromal invasion is not identified. -    Parametrial margins are negative. -   Bilateral ovaries with stromal hyperplasia. -   Cervix uteri and bilateral fallopian tubes, negative for malignancy. -   Right fallopian tube with simple paratubal cyst.  Addendum Microsatellite Instability (MSI) Analysis by PCR - Microsatellite Stable (BECKY) Comment:  No defective mismatch repair is detected.

## 2024-04-02 NOTE — HISTORY OF PRESENT ILLNESS
[FreeTextEntry1] : **Please note that this visit was converted to telemed due to technical difficulties** Permission was granted for this visit.  Ms. Salazar, 78 years old, states she had postmenopausal bleeding x 4 days and presented to the ED on 2024. Workup included imaging identifying right ovarian mass, thickened endometrium, as well as a right kidney mass concerning for RCC. She was referred for further management.  Denies f/c/n/v/d. Denies changes to bowel or bladder habits; denies unexplained weight loss or gain. She does report 3 months of pelvic pain and low back pain. She does have chronic issues of irritable bowel, as well as urinary urgency and frequency as well as urinary incontinence.  The patient's only issue is some mild vaginal spotting. Since her last visit the patient has an appointment with Dr. Alegre from urology and the patient was reminded of this visit which is upcoming on . Instructions provided to the patient on where to report for this visit with Dr. Alegre and she expressed verbal understanding. Additionally the patient had her MRI performed yesterday however the results are still pending and we discussed that we will review this via telemedicine visit once available. Also we reached out to pathology and her endometrial biopsy results are still pending. We discussed that at her next telemedicine visit we reviewed the MRI as well as her pending pathology. Nonetheless we discussed that once her urological issues have been worked up an endometrial biopsy is back to inform us of the potential management needed for this that at least she would likely need surgical management in the form of bilateral salpingo-oophorectomy and D&C but possible hysterectomy depending on what the endometrial biopsy shows. The patient expressed that she understands the above and that we will have a telemedicine visit once all workup was completed.  She is now s/p Examination under anesthesia, robotic-assisted total laparoscopic hysterectomy, bilateral salpingo-oophorectomy, bilateral sentinel lymph node mapping and dissection, bilateral transversus abdominis plane block, cystoscopy. and is here for telemedicine visit as she would like to review her BD affirm results given her prior complaint of vaginal discharge. We reviewed her results in detail revealing no evidence of any infection.  Of note the patient also reports that her vaginal discharge is improved.  When questioning more into this vaginal discharge she notes that it all predated her surgery and we reviewed that that it may have been due to her underlying endometrial cancer.  She does note today that the discharge is improved and she is without symptoms.  She is overall doing very well and very grateful for her care so far.  2024 [facility] ED Chief Complaint: The patient is a 78y Female complaining of vaginal bleeding. HPI Objective Statement: 78-year-old female history of hypertension, hyperlipidemia, presenting with chief complaint of vaginal bleeding and lower abdominal pain since yesterday. Patient is denying any chest pain, lightheadedness, syncope, or dizziness. No nausea, vomiting, fevers or chills. meds: aspirin, statin, losartan, pantoprazole, baclofen NKDA  Imagin2024 CT A/P ([facility]) LOWER CHEST: Small bilateral atelectasis. LIVER: Hepatic steatosis. A few small hypodense lesions in the liver, too small to characterize. 7 mm enhancing lesion in hepatic segment 5 (image 51 series 2); if clinically indicated, abdominal MR without and with IV contrast may be pursued for further evaluation. BILE DUCTS: Normal caliber. GALLBLADDER: Within normal limits. SPLEEN: Within normal limits. PANCREAS: Within normal limits. ADRENALS: Within normal limits. KIDNEYS/URETERS: 1.9 cm left renal cyst. Apparent 1.1 cm enhancing lesion in the lower pole of the right kidney, suspicious for a renal cell carcinoma. No evidence for a ureteral calculus. No hydronephrosis. BLADDER: Within normal limits. REPRODUCTIVE ORGANS: A few myometrial masses, likely representing uterine fibroids. The left adnexa appears unremarkable. 4.2 x 2.9 cm asymmetric soft tissue structure in the right adnexa may represent a right ovarian mass, or abnormal right ovary due to torsion. If clinically indicated, pelvic ultrasound may be pursued for further evaluation. BOWEL: No bowel obstruction or grossly thickened bowel wall. Appendix within normal limits. PERITONEUM: No free air or ascites. VESSELS: Calcified atherosclerotic disease. RETROPERITONEUM/LYMPH NODES: No lymphadenopathy. ABDOMINAL WALL: Small fat-containing umbilical hernia. BONES: Degenerative joint disease. Age indeterminate compression fracture at the superior endplate of T11 vertebra. IMPRESSION: 4.2 x 2.9 cm asymmetric soft tissue structure in the right adnexa may represent a right ovarian mass, or abnormal right ovary due to torsion. If clinically indicated, pelvic ultrasound may be pursued for further evaluation. 7 mm enhancing lesion in hepatic segment 5. Apparent 1.1 cm enhancing lesion in the lower pole of the right kidney, suspicious for a renal cell carcinoma. If clinically indicated, abdominal MR without and with IV contrast may be pursued for further evaluation. Age indeterminate compression fracture at the superior endplate of T11 vertebra.  24 TVUS ([facility]) Uterus: 7.6 cm x 4.1 cm x 5.2 cm. multiple fibroids are appreciated. There is a posterior submucosal fibroid measuring up to 1.4 cm. Posterior subserosal fibroid is also noted which measures up to 7 cm. Multiple parenchymal cystic spaces are appreciated and there is diffuse nonspecific heterogeneity of the uterine parenchyma. Endometrium: 7 mm. Tiny cyst is seen within the endometrium. Right ovary: 2.0 cm x 2.3 cm x 2.3 cm. Posterior to the Right Ovary within the Right Adnexa There Is a 4.3 x 3.6 x 3.9 cm septated cyst. There is some nodularity of the cyst wall. Arterial and venous flow is demonstrated.This may represent a pedunculated ovarian versus paraovarian cystic lesion. Left ovary: 1.5 cm x 1.2 cm x 1.1 cm. Within normal limits. Arterial and venous flow is demonstrated. Fluid: None. IMPRESSION: Complex cystic mass of the right adnexa with associated septations and peripheral nodularity. This may represent a pedunculated ovarian or paraovarian complex cystic mass. Other differential considerations include mesenteric cyst. The origin of this cystic mass is not clearly delineated on this sonogram. This nonetheless has some suspicious features which may indicate a malignant process. Consider further correlation with contrast-enhanced MRI. No sonographic evidence of acute ovarian torsion at the time scanning. Heterogeneous myomatous uterus. Tiny endometrial cyst with the endometrium measuring 7 mm. This is prominent in size given patient's postmenopausal status. Dedicated GYN assessment is advised and should dictate the need for further imaging or intervention workup.  Labs: 24:  = 8; CEA = 1.0;  = <2; Inhibin B = < 7.0; HbA1c = 5.8;   POB: G6, P4 (2 miscarriages, 4 vaginal deliveries, oldest child is 60 youngest child is in their 40s, patient cannot recall age) PGYN: Menarche age 12, LMP age 38 PMH: Hypertension, urinary incontinence, urge incontinence, hypothyroidism, hyperlipidemia, irritable bowel syndrome Meds: Myrbetriz, pantoprazole, baclofen, Synthroid, Linzess, Kerendia, aspirin 81 mg, atorvastatin, losartan Allergies: NKDA PSH: No past surgical history Social Hx: Non-smoker, no alcohol, no marijuana, no illicit drugs, lives with her sister. FH: Father-throat cancer-age 75; maternal cousin-breast cancer-age 30; maternal first cousin-kidney cancer-age 50; Brother-prostate cancer-age 70.  Health Maintenance: Mammogram: . Per patient normal. Colonoscopy: . Per patient normal PAP: 24: Negative for intraepithelial lesion or malignancy. Negative HPV ([facility])

## 2024-04-02 NOTE — DISCUSSION/SUMMARY
[Dry] : was dry [Clean] : was clean [Intact] : was intact [Ecchymosis] : was not ecchymotic [Erythema] : was not erythematous [Normal Skin] : normal appearance [None] : had no drainage [Normal Skin Turgor] : normal skin turgor [Firm] : soft [Rebound] : no rebound tenderness [Tender] : nontender [Abnormal Bowel Sounds] : normal bowel sounds [Vaginal Exam Abnormal] : normal vaginal exam [Guarding] : no guarding [Doing Well] : is doing well [No Sign of Infection] : is showing no signs of infection [Excellent Pain Control] : has excellent pain control [Reviewed Clinical Lab Test(s)] : Results of clinical tests were reviewed. [Discuss Alternatives/Risks/Benefits w/Patient] : All alternatives, risks, and benefits were discussed with the patient/family and all questions were answered.  Patient expressed good understanding and appreciates the importance of follow up as recommended. [Discuss Tests w/Referring Providers] : Results of labs/radiology studies and the treatment recommendations were discussed with performing/referring physician. [Visit Time ___ Minutes] : [unfilled] minutes

## 2024-05-06 ENCOUNTER — APPOINTMENT (OUTPATIENT)
Dept: UROLOGY | Facility: CLINIC | Age: 79
End: 2024-05-06
Payer: MEDICARE

## 2024-05-06 VITALS
HEART RATE: 71 BPM | SYSTOLIC BLOOD PRESSURE: 128 MMHG | TEMPERATURE: 97.8 F | OXYGEN SATURATION: 97 % | DIASTOLIC BLOOD PRESSURE: 76 MMHG | WEIGHT: 164 LBS | HEIGHT: 60 IN | BODY MASS INDEX: 32.2 KG/M2

## 2024-05-06 DIAGNOSIS — N28.89 OTHER SPECIFIED DISORDERS OF KIDNEY AND URETER: ICD-10-CM

## 2024-05-06 PROCEDURE — 76775 US EXAM ABDO BACK WALL LIM: CPT

## 2024-07-12 ENCOUNTER — APPOINTMENT (OUTPATIENT)
Dept: GYNECOLOGIC ONCOLOGY | Facility: CLINIC | Age: 79
End: 2024-07-12
Payer: MEDICARE

## 2024-07-12 VITALS
DIASTOLIC BLOOD PRESSURE: 75 MMHG | RESPIRATION RATE: 16 BRPM | WEIGHT: 164 LBS | HEART RATE: 73 BPM | BODY MASS INDEX: 32.2 KG/M2 | TEMPERATURE: 97.7 F | OXYGEN SATURATION: 97 % | HEIGHT: 60 IN | SYSTOLIC BLOOD PRESSURE: 121 MMHG

## 2024-07-12 PROCEDURE — 99459 PELVIC EXAMINATION: CPT

## 2024-07-12 PROCEDURE — G2211 COMPLEX E/M VISIT ADD ON: CPT

## 2024-07-12 PROCEDURE — 99214 OFFICE O/P EST MOD 30 MIN: CPT

## 2024-10-18 ENCOUNTER — APPOINTMENT (OUTPATIENT)
Dept: GYNECOLOGIC ONCOLOGY | Facility: CLINIC | Age: 79
End: 2024-10-18

## 2024-10-18 VITALS
DIASTOLIC BLOOD PRESSURE: 59 MMHG | BODY MASS INDEX: 31.02 KG/M2 | SYSTOLIC BLOOD PRESSURE: 105 MMHG | HEIGHT: 60 IN | HEART RATE: 82 BPM | TEMPERATURE: 97.2 F | WEIGHT: 158 LBS | OXYGEN SATURATION: 96 %

## 2024-10-18 DIAGNOSIS — N85.02 ENDOMETRIAL INTRAEPITHELIAL NEOPLASIA [EIN]: ICD-10-CM

## 2024-10-18 DIAGNOSIS — N93.9 ABNORMAL UTERINE AND VAGINAL BLEEDING, UNSPECIFIED: ICD-10-CM

## 2024-10-18 DIAGNOSIS — C54.1 MALIGNANT NEOPLASM OF ENDOMETRIUM: ICD-10-CM

## 2024-10-18 PROCEDURE — G2211 COMPLEX E/M VISIT ADD ON: CPT

## 2024-10-18 PROCEDURE — 57100 BIOPSY VAGINAL MUCOSA SIMPLE: CPT

## 2024-10-18 PROCEDURE — 99459 PELVIC EXAMINATION: CPT

## 2024-10-18 PROCEDURE — 99214 OFFICE O/P EST MOD 30 MIN: CPT | Mod: 25

## 2024-10-21 LAB — CORE LAB BIOPSY: NORMAL

## 2024-10-25 ENCOUNTER — APPOINTMENT (OUTPATIENT)
Dept: GYNECOLOGIC ONCOLOGY | Facility: CLINIC | Age: 79
End: 2024-10-25
Payer: MEDICARE

## 2024-10-25 VITALS
WEIGHT: 161 LBS | DIASTOLIC BLOOD PRESSURE: 75 MMHG | BODY MASS INDEX: 31.61 KG/M2 | HEIGHT: 60 IN | OXYGEN SATURATION: 97 % | TEMPERATURE: 97.6 F | SYSTOLIC BLOOD PRESSURE: 129 MMHG | HEART RATE: 76 BPM

## 2024-10-25 PROCEDURE — 99459 PELVIC EXAMINATION: CPT

## 2024-10-25 PROCEDURE — 99214 OFFICE O/P EST MOD 30 MIN: CPT

## 2024-11-06 ENCOUNTER — APPOINTMENT (OUTPATIENT)
Dept: CT IMAGING | Facility: CLINIC | Age: 79
End: 2024-11-06

## 2024-11-06 PROCEDURE — 74178 CT ABD&PLV WO CNTR FLWD CNTR: CPT

## 2025-07-10 ENCOUNTER — APPOINTMENT (OUTPATIENT)
Age: 80
End: 2025-07-10
Payer: MEDICARE

## 2025-07-10 VITALS
SYSTOLIC BLOOD PRESSURE: 141 MMHG | OXYGEN SATURATION: 97 % | HEART RATE: 67 BPM | DIASTOLIC BLOOD PRESSURE: 78 MMHG | TEMPERATURE: 97.2 F

## 2025-07-10 PROCEDURE — 76775 US EXAM ABDO BACK WALL LIM: CPT

## (undated) DEVICE — INZII RETRIEVAL SYSTEM 5MM

## (undated) DEVICE — XI ENDOWRIST SUCTION IRRIGATOR 8MM

## (undated) DEVICE — POSITIONER FOAM HEAD CRADLE (PINK)

## (undated) DEVICE — GOWN XL

## (undated) DEVICE — SPECIMEN CONTAINER 100ML

## (undated) DEVICE — SOL IRR POUR H2O 1000ML

## (undated) DEVICE — TROCAR SURGIQUEST AIRSEAL 5MM X 100MM

## (undated) DEVICE — SPECULUM VAGINAL MED DISP

## (undated) DEVICE — FOLEY TRAY 16FR 5CC LTX UMETER CLOSED

## (undated) DEVICE — XI DRAPE COLUMN

## (undated) DEVICE — SOL IRR POUR NS 0.9% 1000ML

## (undated) DEVICE — DRSG STERISTRIPS 0.5 X 4"

## (undated) DEVICE — XI DRAPE ARM

## (undated) DEVICE — D HELP - CLEARVIEW CLEARIFY SYSTEM

## (undated) DEVICE — SUT VLOC 90 2-0 6" GS-21 VIOLET

## (undated) DEVICE — ELCTR CORD FOOTSWITCH 1PLR LAPSCP 10FT

## (undated) DEVICE — TUBING AIRSEAL TRI-LUMEN FILTERED

## (undated) DEVICE — SOL IRR POUR NS 0.9% 500ML

## (undated) DEVICE — XI ARM FORCE BIPOLAR 8MM

## (undated) DEVICE — DRAPE LEGGINGS 6" CUFF 28X43"

## (undated) DEVICE — SUT MONOCRYL 4-0 27" PS-2 UNDYED

## (undated) DEVICE — POSITIONER FOAM EGG CRATE ULNAR 2PCS (PINK)

## (undated) DEVICE — SYR LUER LOK 10CC

## (undated) DEVICE — LUBRICATING JELLY ONESHOT 1.25OZ

## (undated) DEVICE — XI SEAL UNIV 5- 8 MM

## (undated) DEVICE — SYR ASEPTO

## (undated) DEVICE — XI TIP COVER

## (undated) DEVICE — POSITIONER PURPLE ARM ONE STEP (LARGE)

## (undated) DEVICE — NDL SPINAL 22G X 3.5" (BLACK)

## (undated) DEVICE — APPLICATOR VISTASEAL LAP DUAL 35CM RIGID

## (undated) DEVICE — DRAPE ROBOTIC

## (undated) DEVICE — DRSG MASTISOL

## (undated) DEVICE — SOL INJ NS 0.9% 100ML

## (undated) DEVICE — SOL IRR POUR H2O 250ML

## (undated) DEVICE — TUBING STRYKEFLOW II SUCTION / IRRIGATOR

## (undated) DEVICE — TUBING TUR 2 PRONG

## (undated) DEVICE — SUT VICRYL PLUS 0 27" UR-6

## (undated) DEVICE — XI OBTURATOR OPTICAL BLADELESS 8MM

## (undated) DEVICE — PACK ROBOTIC

## (undated) DEVICE — ELCTR GROUNDING PAD ADULT COVIDIEN

## (undated) DEVICE — Device

## (undated) DEVICE — GLV 6.5 PROTEXIS (WHITE)

## (undated) DEVICE — PACK LITHOTOMY

## (undated) DEVICE — DRSG DERMABOND 0.7ML

## (undated) DEVICE — POSITIONER PINK PAD PIGAZZI SYSTEM

## (undated) DEVICE — VENODYNE/SCD SLEEVE CALF MEDIUM

## (undated) DEVICE — POSITIONER PINK PAD PIGAZZI SYSTEM XL W ARM PROTECTOR

## (undated) DEVICE — DEVICE PUREVIEW ACTIVE

## (undated) DEVICE — PACK ROBOTIC LIJ

## (undated) DEVICE — XI ARM NEEDLE DRIVER MEGA

## (undated) DEVICE — WARMING BLANKET UPPER ADULT

## (undated) DEVICE — XI ARM FORCEP PROGRASP 8MM

## (undated) DEVICE — UTERINE MANIPULATOR CONMED VCARE SM 32MM

## (undated) DEVICE — XI VESSEL SEALER